# Patient Record
Sex: MALE | Race: WHITE | NOT HISPANIC OR LATINO | Employment: UNEMPLOYED | URBAN - METROPOLITAN AREA
[De-identification: names, ages, dates, MRNs, and addresses within clinical notes are randomized per-mention and may not be internally consistent; named-entity substitution may affect disease eponyms.]

---

## 2023-06-07 ENCOUNTER — HOSPITAL ENCOUNTER (INPATIENT)
Facility: HOSPITAL | Age: 63
LOS: 5 days | Discharge: HOME | DRG: 871 | End: 2023-06-12
Attending: EMERGENCY MEDICINE | Admitting: INTERNAL MEDICINE

## 2023-06-07 DIAGNOSIS — I50.20 HFREF (HEART FAILURE WITH REDUCED EJECTION FRACTION): ICD-10-CM

## 2023-06-07 DIAGNOSIS — J18.9 PNEUMONIA DUE TO INFECTIOUS ORGANISM, UNSPECIFIED LATERALITY, UNSPECIFIED PART OF LUNG: ICD-10-CM

## 2023-06-07 DIAGNOSIS — J18.9 COMMUNITY ACQUIRED PNEUMONIA, UNSPECIFIED LATERALITY: ICD-10-CM

## 2023-06-07 DIAGNOSIS — I95.9 HYPOTENSION, UNSPECIFIED HYPOTENSION TYPE: ICD-10-CM

## 2023-06-07 DIAGNOSIS — R07.9 CHEST PAIN: ICD-10-CM

## 2023-06-07 DIAGNOSIS — J90 PLEURAL EFFUSION, BILATERAL: ICD-10-CM

## 2023-06-07 DIAGNOSIS — A41.9 SEPSIS, DUE TO UNSPECIFIED ORGANISM, UNSPECIFIED WHETHER ACUTE ORGAN DYSFUNCTION PRESENT: ICD-10-CM

## 2023-06-07 DIAGNOSIS — E11.9 TYPE 2 DIABETES MELLITUS WITHOUT COMPLICATION, WITHOUT LONG-TERM CURRENT USE OF INSULIN: ICD-10-CM

## 2023-06-07 DIAGNOSIS — I48.91 ATRIAL FIBRILLATION WITH RAPID VENTRICULAR RESPONSE: ICD-10-CM

## 2023-06-07 DIAGNOSIS — R00.0 TACHYCARDIA: ICD-10-CM

## 2023-06-07 DIAGNOSIS — I48.91 ATRIAL FIBRILLATION WITH RVR: Primary | ICD-10-CM

## 2023-06-07 LAB
ALBUMIN SERPL-MCNC: 4.2 G/DL (ref 3.4–4.8)
ALBUMIN/GLOB SERPL: 1.2 RATIO (ref 1.1–2)
ALP SERPL-CCNC: 80 UNIT/L (ref 40–150)
ALT SERPL-CCNC: 27 UNIT/L (ref 0–55)
APPEARANCE UR: CLEAR
APTT PPP: 33.5 SECONDS
APTT PPP: 33.7 SECONDS
AST SERPL-CCNC: 26 UNIT/L (ref 5–34)
BACTERIA #/AREA URNS AUTO: ABNORMAL /HPF
BASOPHILS # BLD AUTO: 0.07 X10(3)/MCL
BASOPHILS NFR BLD AUTO: 0.6 %
BILIRUB UR QL STRIP.AUTO: NEGATIVE MG/DL
BILIRUBIN DIRECT+TOT PNL SERPL-MCNC: 0.6 MG/DL
BNP BLD-MCNC: 242.9 PG/ML
BUN SERPL-MCNC: 9.6 MG/DL (ref 8.4–25.7)
CALCIUM SERPL-MCNC: 10.1 MG/DL (ref 8.8–10)
CHLORIDE SERPL-SCNC: 100 MMOL/L (ref 98–107)
CO2 SERPL-SCNC: 26 MMOL/L (ref 23–31)
COLOR UR: ABNORMAL
CREAT SERPL-MCNC: 1.08 MG/DL (ref 0.73–1.18)
D DIMER PPP IA.FEU-MCNC: 1.14 UG/ML FEU (ref 0–0.5)
EOSINOPHIL # BLD AUTO: 0.12 X10(3)/MCL (ref 0–0.9)
EOSINOPHIL NFR BLD AUTO: 1 %
ERYTHROCYTE [DISTWIDTH] IN BLOOD BY AUTOMATED COUNT: 12 % (ref 11.5–17)
EST. AVERAGE GLUCOSE BLD GHB EST-MCNC: 251.8 MG/DL
FLUAV AG UPPER RESP QL IA.RAPID: NOT DETECTED
FLUBV AG UPPER RESP QL IA.RAPID: NOT DETECTED
GFR SERPLBLD CREATININE-BSD FMLA CKD-EPI: >60 MLS/MIN/1.73/M2
GLOBULIN SER-MCNC: 3.5 GM/DL (ref 2.4–3.5)
GLUCOSE SERPL-MCNC: 302 MG/DL (ref 82–115)
GLUCOSE UR QL STRIP.AUTO: ABNORMAL MG/DL
HBA1C MFR BLD: 10.4 %
HCT VFR BLD AUTO: 48.4 % (ref 42–52)
HGB BLD-MCNC: 16.1 G/DL (ref 14–18)
HYALINE CASTS #/AREA URNS LPF: ABNORMAL /LPF
IMM GRANULOCYTES # BLD AUTO: 0.05 X10(3)/MCL (ref 0–0.04)
IMM GRANULOCYTES NFR BLD AUTO: 0.4 %
KETONES UR QL STRIP.AUTO: NEGATIVE MG/DL
LACTATE SERPL-SCNC: 2.5 MMOL/L (ref 0.5–2.2)
LACTATE SERPL-SCNC: 2.8 MMOL/L (ref 0.5–2.2)
LACTATE SERPL-SCNC: 4.2 MMOL/L (ref 0.5–2.2)
LEUKOCYTE ESTERASE UR QL STRIP.AUTO: NEGATIVE UNIT/L
LYMPHOCYTES # BLD AUTO: 3.23 X10(3)/MCL (ref 0.6–4.6)
LYMPHOCYTES NFR BLD AUTO: 26.1 %
MAGNESIUM SERPL-MCNC: 1.6 MG/DL (ref 1.6–2.6)
MCH RBC QN AUTO: 31.3 PG (ref 27–31)
MCHC RBC AUTO-ENTMCNC: 33.3 G/DL (ref 33–36)
MCV RBC AUTO: 94.2 FL (ref 80–94)
MONOCYTES # BLD AUTO: 0.73 X10(3)/MCL (ref 0.1–1.3)
MONOCYTES NFR BLD AUTO: 5.9 %
MUCOUS THREADS URNS QL MICRO: ABNORMAL /LPF
NEUTROPHILS # BLD AUTO: 8.16 X10(3)/MCL (ref 2.1–9.2)
NEUTROPHILS NFR BLD AUTO: 66 %
NITRITE UR QL STRIP.AUTO: NEGATIVE
NRBC BLD AUTO-RTO: 0 %
PH UR STRIP.AUTO: 6 [PH]
PLATELET # BLD AUTO: 205 X10(3)/MCL (ref 130–400)
PMV BLD AUTO: 11 FL (ref 7.4–10.4)
POCT GLUCOSE: 312 MG/DL (ref 70–110)
POTASSIUM SERPL-SCNC: 4.1 MMOL/L (ref 3.5–5.1)
PROT SERPL-MCNC: 7.7 GM/DL (ref 5.8–7.6)
PROT UR QL STRIP.AUTO: ABNORMAL MG/DL
RBC # BLD AUTO: 5.14 X10(6)/MCL (ref 4.7–6.1)
RBC #/AREA URNS AUTO: ABNORMAL /HPF
RBC UR QL AUTO: NEGATIVE UNIT/L
SARS-COV-2 RNA RESP QL NAA+PROBE: NOT DETECTED
SODIUM SERPL-SCNC: 136 MMOL/L (ref 136–145)
SP GR UR STRIP.AUTO: 1.03
SQUAMOUS #/AREA URNS LPF: ABNORMAL /HPF
T4 FREE SERPL-MCNC: 1.12 NG/DL (ref 0.7–1.48)
TROPONIN I SERPL-MCNC: 0.04 NG/ML (ref 0–0.04)
TSH SERPL-ACNC: 5.67 UIU/ML (ref 0.35–4.94)
UROBILINOGEN UR STRIP-ACNC: NORMAL MG/DL
WBC # SPEC AUTO: 12.36 X10(3)/MCL (ref 4.5–11.5)
WBC #/AREA URNS AUTO: ABNORMAL /HPF

## 2023-06-07 PROCEDURE — 96368 THER/DIAG CONCURRENT INF: CPT

## 2023-06-07 PROCEDURE — 84484 ASSAY OF TROPONIN QUANT: CPT | Performed by: PHYSICIAN ASSISTANT

## 2023-06-07 PROCEDURE — 87641 MR-STAPH DNA AMP PROBE: CPT | Performed by: STUDENT IN AN ORGANIZED HEALTH CARE EDUCATION/TRAINING PROGRAM

## 2023-06-07 PROCEDURE — 83605 ASSAY OF LACTIC ACID: CPT | Performed by: EMERGENCY MEDICINE

## 2023-06-07 PROCEDURE — 25000003 PHARM REV CODE 250

## 2023-06-07 PROCEDURE — 85730 THROMBOPLASTIN TIME PARTIAL: CPT | Performed by: PHYSICIAN ASSISTANT

## 2023-06-07 PROCEDURE — 96375 TX/PRO/DX INJ NEW DRUG ADDON: CPT

## 2023-06-07 PROCEDURE — 93005 ELECTROCARDIOGRAM TRACING: CPT

## 2023-06-07 PROCEDURE — G0378 HOSPITAL OBSERVATION PER HR: HCPCS

## 2023-06-07 PROCEDURE — 96365 THER/PROPH/DIAG IV INF INIT: CPT

## 2023-06-07 PROCEDURE — 25500020 PHARM REV CODE 255: Performed by: EMERGENCY MEDICINE

## 2023-06-07 PROCEDURE — 84439 ASSAY OF FREE THYROXINE: CPT | Performed by: PHYSICIAN ASSISTANT

## 2023-06-07 PROCEDURE — 83735 ASSAY OF MAGNESIUM: CPT | Performed by: PHYSICIAN ASSISTANT

## 2023-06-07 PROCEDURE — 87205 SMEAR GRAM STAIN: CPT | Performed by: STUDENT IN AN ORGANIZED HEALTH CARE EDUCATION/TRAINING PROGRAM

## 2023-06-07 PROCEDURE — 63600175 PHARM REV CODE 636 W HCPCS: Performed by: EMERGENCY MEDICINE

## 2023-06-07 PROCEDURE — 85730 THROMBOPLASTIN TIME PARTIAL: CPT | Mod: 91 | Performed by: INTERNAL MEDICINE

## 2023-06-07 PROCEDURE — 84443 ASSAY THYROID STIM HORMONE: CPT | Performed by: PHYSICIAN ASSISTANT

## 2023-06-07 PROCEDURE — 80053 COMPREHEN METABOLIC PANEL: CPT | Performed by: PHYSICIAN ASSISTANT

## 2023-06-07 PROCEDURE — 85379 FIBRIN DEGRADATION QUANT: CPT | Performed by: EMERGENCY MEDICINE

## 2023-06-07 PROCEDURE — 83605 ASSAY OF LACTIC ACID: CPT | Mod: 91 | Performed by: STUDENT IN AN ORGANIZED HEALTH CARE EDUCATION/TRAINING PROGRAM

## 2023-06-07 PROCEDURE — 96367 TX/PROPH/DG ADDL SEQ IV INF: CPT

## 2023-06-07 PROCEDURE — 96372 THER/PROPH/DIAG INJ SC/IM: CPT | Performed by: STUDENT IN AN ORGANIZED HEALTH CARE EDUCATION/TRAINING PROGRAM

## 2023-06-07 PROCEDURE — 63600175 PHARM REV CODE 636 W HCPCS: Performed by: STUDENT IN AN ORGANIZED HEALTH CARE EDUCATION/TRAINING PROGRAM

## 2023-06-07 PROCEDURE — 87040 BLOOD CULTURE FOR BACTERIA: CPT | Performed by: EMERGENCY MEDICINE

## 2023-06-07 PROCEDURE — 83036 HEMOGLOBIN GLYCOSYLATED A1C: CPT | Performed by: STUDENT IN AN ORGANIZED HEALTH CARE EDUCATION/TRAINING PROGRAM

## 2023-06-07 PROCEDURE — 25000003 PHARM REV CODE 250: Performed by: EMERGENCY MEDICINE

## 2023-06-07 PROCEDURE — 0240U COVID/FLU A&B PCR: CPT | Performed by: PHYSICIAN ASSISTANT

## 2023-06-07 PROCEDURE — 81001 URINALYSIS AUTO W/SCOPE: CPT | Performed by: EMERGENCY MEDICINE

## 2023-06-07 PROCEDURE — 80061 LIPID PANEL: CPT | Performed by: STUDENT IN AN ORGANIZED HEALTH CARE EDUCATION/TRAINING PROGRAM

## 2023-06-07 PROCEDURE — 85610 PROTHROMBIN TIME: CPT | Performed by: PHYSICIAN ASSISTANT

## 2023-06-07 PROCEDURE — 83880 ASSAY OF NATRIURETIC PEPTIDE: CPT | Performed by: PHYSICIAN ASSISTANT

## 2023-06-07 PROCEDURE — 25000003 PHARM REV CODE 250: Performed by: STUDENT IN AN ORGANIZED HEALTH CARE EDUCATION/TRAINING PROGRAM

## 2023-06-07 PROCEDURE — 99285 EMERGENCY DEPT VISIT HI MDM: CPT | Mod: 25

## 2023-06-07 PROCEDURE — 84100 ASSAY OF PHOSPHORUS: CPT | Performed by: STUDENT IN AN ORGANIZED HEALTH CARE EDUCATION/TRAINING PROGRAM

## 2023-06-07 PROCEDURE — 87070 CULTURE OTHR SPECIMN AEROBIC: CPT | Performed by: STUDENT IN AN ORGANIZED HEALTH CARE EDUCATION/TRAINING PROGRAM

## 2023-06-07 PROCEDURE — 85025 COMPLETE CBC W/AUTO DIFF WBC: CPT | Performed by: PHYSICIAN ASSISTANT

## 2023-06-07 PROCEDURE — 11000001 HC ACUTE MED/SURG PRIVATE ROOM

## 2023-06-07 RX ORDER — ASPIRIN 81 MG/1
81 TABLET ORAL DAILY
Status: DISCONTINUED | OUTPATIENT
Start: 2023-06-08 | End: 2023-06-12 | Stop reason: HOSPADM

## 2023-06-07 RX ORDER — LAMOTRIGINE 200 MG/1
200 TABLET ORAL DAILY
COMMUNITY
Start: 2023-05-15

## 2023-06-07 RX ORDER — LAMOTRIGINE 100 MG/1
200 TABLET ORAL DAILY
Status: DISCONTINUED | OUTPATIENT
Start: 2023-06-08 | End: 2023-06-12 | Stop reason: HOSPADM

## 2023-06-07 RX ORDER — MIRTAZAPINE 30 MG/1
30 TABLET, FILM COATED ORAL NIGHTLY
Status: DISCONTINUED | OUTPATIENT
Start: 2023-06-07 | End: 2023-06-12 | Stop reason: HOSPADM

## 2023-06-07 RX ORDER — DULOXETIN HYDROCHLORIDE 60 MG/1
120 CAPSULE, DELAYED RELEASE ORAL DAILY
COMMUNITY
Start: 2023-05-15

## 2023-06-07 RX ORDER — SODIUM CHLORIDE, SODIUM LACTATE, POTASSIUM CHLORIDE, CALCIUM CHLORIDE 600; 310; 30; 20 MG/100ML; MG/100ML; MG/100ML; MG/100ML
INJECTION, SOLUTION INTRAVENOUS CONTINUOUS
Status: DISCONTINUED | OUTPATIENT
Start: 2023-06-08 | End: 2023-06-08

## 2023-06-07 RX ORDER — METFORMIN HYDROCHLORIDE 1000 MG/1
1000 TABLET ORAL 2 TIMES DAILY
COMMUNITY
Start: 2023-06-02 | End: 2024-03-26 | Stop reason: SDUPTHER

## 2023-06-07 RX ORDER — AMOXICILLIN 500 MG
1 CAPSULE ORAL 2 TIMES DAILY
COMMUNITY

## 2023-06-07 RX ORDER — ATORVASTATIN CALCIUM 20 MG/1
20 TABLET, FILM COATED ORAL NIGHTLY
Status: DISCONTINUED | OUTPATIENT
Start: 2023-06-07 | End: 2023-06-12 | Stop reason: HOSPADM

## 2023-06-07 RX ORDER — METOPROLOL TARTRATE 1 MG/ML
5 INJECTION, SOLUTION INTRAVENOUS
Status: COMPLETED | OUTPATIENT
Start: 2023-06-07 | End: 2023-06-07

## 2023-06-07 RX ORDER — ATORVASTATIN CALCIUM 20 MG/1
20 TABLET, FILM COATED ORAL NIGHTLY
COMMUNITY
Start: 2023-06-06 | End: 2024-03-26 | Stop reason: SDUPTHER

## 2023-06-07 RX ORDER — INSULIN ASPART 100 [IU]/ML
0-5 INJECTION, SOLUTION INTRAVENOUS; SUBCUTANEOUS
Status: DISCONTINUED | OUTPATIENT
Start: 2023-06-07 | End: 2023-06-09

## 2023-06-07 RX ORDER — TRAZODONE HYDROCHLORIDE 150 MG/1
450 TABLET ORAL NIGHTLY
COMMUNITY
Start: 2023-05-15

## 2023-06-07 RX ORDER — ASCORBIC ACID 500 MG
500 TABLET ORAL 2 TIMES DAILY
COMMUNITY

## 2023-06-07 RX ORDER — SODIUM CHLORIDE, SODIUM LACTATE, POTASSIUM CHLORIDE, CALCIUM CHLORIDE 600; 310; 30; 20 MG/100ML; MG/100ML; MG/100ML; MG/100ML
INJECTION, SOLUTION INTRAVENOUS CONTINUOUS
Status: DISCONTINUED | OUTPATIENT
Start: 2023-06-07 | End: 2023-06-07

## 2023-06-07 RX ORDER — SODIUM CHLORIDE 0.9 % (FLUSH) 0.9 %
10 SYRINGE (ML) INJECTION EVERY 12 HOURS PRN
Status: DISCONTINUED | OUTPATIENT
Start: 2023-06-07 | End: 2023-06-07

## 2023-06-07 RX ORDER — DULOXETIN HYDROCHLORIDE 30 MG/1
120 CAPSULE, DELAYED RELEASE ORAL DAILY
Status: DISCONTINUED | OUTPATIENT
Start: 2023-06-08 | End: 2023-06-12 | Stop reason: HOSPADM

## 2023-06-07 RX ORDER — ASPIRIN 81 MG/1
81 TABLET ORAL DAILY
COMMUNITY

## 2023-06-07 RX ORDER — RAMIPRIL 10 MG/1
10 CAPSULE ORAL DAILY
Status: ON HOLD | COMMUNITY
Start: 2023-06-06 | End: 2023-06-12 | Stop reason: HOSPADM

## 2023-06-07 RX ORDER — DEXTROSE 40 %
30 GEL (GRAM) ORAL
Status: DISCONTINUED | OUTPATIENT
Start: 2023-06-07 | End: 2023-06-12 | Stop reason: HOSPADM

## 2023-06-07 RX ORDER — NALOXONE HCL 0.4 MG/ML
0.02 VIAL (ML) INJECTION
Status: DISCONTINUED | OUTPATIENT
Start: 2023-06-07 | End: 2023-06-12 | Stop reason: HOSPADM

## 2023-06-07 RX ORDER — MONTELUKAST SODIUM 10 MG/1
10 TABLET ORAL NIGHTLY
COMMUNITY
End: 2023-07-19

## 2023-06-07 RX ORDER — DEXTROSE 40 %
15 GEL (GRAM) ORAL
Status: DISCONTINUED | OUTPATIENT
Start: 2023-06-07 | End: 2023-06-12 | Stop reason: HOSPADM

## 2023-06-07 RX ORDER — HEPARIN SODIUM,PORCINE/D5W 25000/250
0-40 INTRAVENOUS SOLUTION INTRAVENOUS CONTINUOUS
Status: DISCONTINUED | OUTPATIENT
Start: 2023-06-07 | End: 2023-06-09

## 2023-06-07 RX ORDER — MIRTAZAPINE 30 MG/1
30 TABLET, FILM COATED ORAL NIGHTLY
COMMUNITY
Start: 2023-05-15

## 2023-06-07 RX ORDER — PANTOPRAZOLE SODIUM 40 MG/1
40 TABLET, DELAYED RELEASE ORAL DAILY
Status: DISCONTINUED | OUTPATIENT
Start: 2023-06-07 | End: 2023-06-12 | Stop reason: HOSPADM

## 2023-06-07 RX ORDER — GLUCAGON 1 MG
1 KIT INJECTION
Status: DISCONTINUED | OUTPATIENT
Start: 2023-06-07 | End: 2023-06-12 | Stop reason: HOSPADM

## 2023-06-07 RX ORDER — LORATADINE 10 MG/1
10 TABLET ORAL DAILY
COMMUNITY

## 2023-06-07 RX ORDER — MAGNESIUM SULFATE HEPTAHYDRATE 40 MG/ML
4 INJECTION, SOLUTION INTRAVENOUS
Status: COMPLETED | OUTPATIENT
Start: 2023-06-07 | End: 2023-06-07

## 2023-06-07 RX ADMIN — ATORVASTATIN CALCIUM 20 MG: 20 TABLET, FILM COATED ORAL at 08:06

## 2023-06-07 RX ADMIN — HUMAN INSULIN 10 UNITS: 100 INJECTION, SOLUTION SUBCUTANEOUS at 08:06

## 2023-06-07 RX ADMIN — MAGNESIUM SULFATE HEPTAHYDRATE 4 G: 40 INJECTION, SOLUTION INTRAVENOUS at 11:06

## 2023-06-07 RX ADMIN — METOPROLOL TARTRATE 5 MG: 1 INJECTION, SOLUTION INTRAVENOUS at 11:06

## 2023-06-07 RX ADMIN — DEXTROSE MONOHYDRATE 100 MG: 50 INJECTION, SOLUTION INTRAVENOUS at 12:06

## 2023-06-07 RX ADMIN — CEFTRIAXONE SODIUM 2 G: 2 INJECTION, POWDER, FOR SOLUTION INTRAMUSCULAR; INTRAVENOUS at 12:06

## 2023-06-07 RX ADMIN — PANTOPRAZOLE SODIUM 40 MG: 40 TABLET, DELAYED RELEASE ORAL at 10:06

## 2023-06-07 RX ADMIN — SODIUM CHLORIDE, POTASSIUM CHLORIDE, SODIUM LACTATE AND CALCIUM CHLORIDE 1000 ML: 600; 310; 30; 20 INJECTION, SOLUTION INTRAVENOUS at 11:06

## 2023-06-07 RX ADMIN — AMIODARONE HYDROCHLORIDE 1 MG/MIN: 1.8 INJECTION, SOLUTION INTRAVENOUS at 12:06

## 2023-06-07 RX ADMIN — AMIODARONE HYDROCHLORIDE 150 MG: 1.5 INJECTION, SOLUTION INTRAVENOUS at 12:06

## 2023-06-07 RX ADMIN — HEPARIN SODIUM 10 UNITS/KG/HR: 10000 INJECTION, SOLUTION INTRAVENOUS at 08:06

## 2023-06-07 RX ADMIN — IOHEXOL 100 ML: 350 INJECTION, SOLUTION INTRAVENOUS at 01:06

## 2023-06-07 RX ADMIN — SODIUM CHLORIDE, POTASSIUM CHLORIDE, SODIUM LACTATE AND CALCIUM CHLORIDE 1000 ML: 600; 310; 30; 20 INJECTION, SOLUTION INTRAVENOUS at 01:06

## 2023-06-07 RX ADMIN — AMIODARONE HYDROCHLORIDE 0.5 MG/MIN: 1.8 INJECTION, SOLUTION INTRAVENOUS at 06:06

## 2023-06-07 RX ADMIN — INSULIN ASPART 2 UNITS: 100 INJECTION, SOLUTION INTRAVENOUS; SUBCUTANEOUS at 08:06

## 2023-06-07 RX ADMIN — MIRTAZAPINE 30 MG: 30 TABLET, FILM COATED ORAL at 08:06

## 2023-06-07 NOTE — H&P
[facility] Internal Medicine History and Physical     Date of Admit: 6/7/2023  Current Hospital Day: 1     Chief Complaint:      Shortness of Breath (PT W CO SOB/COUGH AND INTERMITTENT CP X 3 DAYS.  RECENT EXPOSURE TO COVID AT WORK.  HR > 130 EKG OBTAINED. EKG A FIB W RVR . NO HX REPORTED. ) and Tachycardia      Subjective:     History of Present Illness:  Alphonso Wyatt is a 62 y.o. male with PMHx of HTN and DM who presented to [facility] ED with primary complaint of worsening shortness of breath, cough, and intermittent CP since Friday (6/8/23). Pt states symptoms began approximately x3 weeks ago with a sinus infection that migrated to his chest then resolved without any difficulties. 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 Friday, he began experiencing shortness of breath and non-productive cough without hemoptysis but with occasional pleuritic chest pain. Prior to event he denies experiencing any HAYNES. Currently reports shortness of breath at rest and with only taking a few steps.  Denies any lower extremity edema.  Reports sleeping in upright recliner for some time even though was able to lie flat without respiratory difficulties, though now is having orthopnea like symptoms.  Patient denies any previous history of DVT/PE.  Denies any recent travel or extended sedentary periods.     ED Course: Upon ED presentation HR significant for tachycardia (160), tachypnea (23), afebrile, and normotensive.  EKG revealed Afib w/ RVR in which Lopressor 5 mg was administered. Heart rate mildly (130 beats per minute) though patient became hypotensive (89/69) thus was initiated on amiodarone gtt.  He was also administered x2 L of LR.  D-dimer (1.14).  CT angio thorax performed, no pulmonary embolism identified, showed moderate B/L pleural effusions and ground-glass opacities (infectious versus inflammatory).  Additional lab significant for  leukocytosis (12.3), hyperglycemia (302), BNP (242.9), TSH (5.6, with normal T4), and lactate (2.5). Internal Medicine consulted     Review of Systems:  Review of Systems   Constitutional:  Negative for chills, diaphoresis, fever and weight loss.   Respiratory:  Positive for cough (non productive) and shortness of breath. Negative for hemoptysis, sputum production and wheezing.    Cardiovascular:  Positive for chest pain (pleuritic) and orthopnea. Negative for palpitations, claudication, leg swelling and PND.   Neurological:  Positive for dizziness and weakness. Negative for focal weakness, seizures and loss of consciousness.     Past Medical History:  Hypertension, diabetes    Past Surgical History:  History reviewed. No pertinent surgical history.    Social History:  Etoh: Prior use, currently occasionally  Tobacco: Current 1ppd (approximate 100 pack year smoker)  Illicit Substances: Denies    Family Hx:  Mother: DM, HTN, CHF, MI  Maternal Grandfather: MI  Multiple Maternal Aunts/Uncles/Cousins: MI  Father: N/A    Allergies:  Review of patient's allergies indicates:  No Known Allergies    Home Medications:  Prior to Admission medications    Medication Sig Start Date End Date Taking? Authorizing Provider   ascorbic acid, vitamin C, (VITAMIN C) 500 MG tablet Take 500 mg by mouth 2 (two) times daily.    Historical Provider   aspirin (ECOTRIN) 81 MG EC tablet Take 81 mg by mouth once daily.    Historical Provider   atorvastatin (LIPITOR) 20 MG tablet Take 20 mg by mouth nightly. 6/6/23   Historical Provider   DULoxetine (CYMBALTA) 60 MG capsule Take 120 mg by mouth once daily. 5/15/23   Historical Provider   lamoTRIgine (LAMICTAL) 200 MG tablet Take 200 mg by mouth once daily. 5/15/23   Historical Provider   loratadine (CLARITIN) 10 mg tablet Take 10 mg by mouth once daily.    Historical Provider   metFORMIN (GLUCOPHAGE) 1000 MG tablet Take 1,000 mg by mouth 2 (two) times daily. 6/2/23   Historical Provider  "  mirtazapine (REMERON) 30 MG tablet Take 30 mg by mouth every evening. 5/15/23   Historical Provider   montelukast (SINGULAIR) 10 mg tablet Take 10 mg by mouth every evening.    Historical Provider   omega-3 fatty acids/fish oil (FISH OIL-OMEGA-3 FATTY ACIDS) 300-1,000 mg capsule Take 1 capsule by mouth 2 (two) times a day.    Historical Provider   ramipriL (ALTACE) 10 MG capsule Take 10 mg by mouth once daily. 6/6/23   Historical Provider   traZODone (DESYREL) 150 MG tablet Take 450 mg by mouth every evening. 5/15/23   Historical Provider        Objective:   Vitals  Vitals  BP: 94/77  Temp: 97.9 °F (36.6 °C)  Temp Source: Oral  Pulse: (!) 122  Resp: 18  SpO2: 95 %  Height: 5' 9" (175.3 cm)  Weight: 95 kg (209 lb 7 oz)    Physical Examination:  Physical Exam  Constitutional:       General: He is not in acute distress.     Appearance: Normal appearance. He is ill-appearing. He is not diaphoretic.   HENT:      Head: Normocephalic and atraumatic.      Mouth/Throat:      Mouth: Mucous membranes are moist.      Pharynx: Oropharynx is clear. No oropharyngeal exudate or posterior oropharyngeal erythema.   Eyes:      General: No scleral icterus.     Extraocular Movements: Extraocular movements intact.      Pupils: Pupils are equal, round, and reactive to light.   Cardiovascular:      Rate and Rhythm: Tachycardia present. Rhythm irregular.      Pulses: Normal pulses.      Heart sounds: Normal heart sounds. No murmur heard.    No gallop.   Pulmonary:      Effort: No respiratory distress.      Breath sounds: No stridor. Rales (R>L) present. No wheezing or rhonchi.   Abdominal:      General: There is distension.      Tenderness: There is no abdominal tenderness. There is no guarding or rebound.   Musculoskeletal:         General: No swelling or tenderness.      Right lower leg: No edema.      Left lower leg: No edema.   Skin:     General: Skin is warm and dry.      Capillary Refill: Capillary refill takes less than 2 seconds. "      Coloration: Skin is not jaundiced or pale.      Findings: No bruising, erythema or lesion.   Neurological:      General: No focal deficit present.      Mental Status: He is alert and oriented to person, place, and time.   Psychiatric:         Mood and Affect: Mood normal.         Behavior: Behavior normal.         Thought Content: Thought content normal.         Judgment: Judgment normal.         Laboratory:  CMP:  Recent Labs   Lab 06/07/23  1048      K 4.1   CHLORIDE 100   CO2 26   BUN 9.6   CREATININE 1.08   GLUCOSE 302*   CALCIUM 10.1*   ALBUMIN 4.2   BILITOT 0.6   AST 26   ALT 27   ALKPHOS 80       CBC:  Recent Labs   Lab 06/07/23  1048   WBC 12.36*   ABSNEUTRO 8.16   RBC 5.14   HGB 16.1   HCT 48.4      MCV 94.2*   RDW 12.0       Trended Cardiac Data:   Recent Labs   Lab 06/07/23  1048   TROPONINI 0.040       Thyroid:   Recent Labs   Lab 06/07/23  1048   TSH 5.666*   [id number] 1.12       Urinalysis:   Recent Labs   Lab 06/07/23  1438   COLORUA Light-Yellow   APPEARANCEUA Clear   SGUA 1.035   PHUA 6.0   PROTEINUA 2+*   GLUCOSEUA Trace*   KETONESUA Negative   BLOODUA Negative   BILIRUBINUA Negative   UROBILINOGEN Normal   NITRITESUA Negative   LEUKOCYTESUR Negative   WBCUA 0-5   BACTERIA None Seen   SQEPUA Trace*   MUCOUSUA Trace*   HYALINECASTS 6-10*   RBCUA 0-5       REVIEWED      Radiology:  X-Ray Chest 1 View    Result Date: 6/7/2023  As above.  Recommend continued follow-up.  If further evaluation is obtained recommend two views the chest. Electronically signed by: Stuart Cornelius Date:    06/07/2023 Time:    11:23    CTA Chest Non-Coronary (PE Studies)    Result Date: 6/7/2023  1. Moderate bilateral pleural effusions with compressive atelectasis. 2. Few foci of ground-glass likely infectious or inflammatory. 3. No convincing pulmonary embolus. 4. Small volume ascites. Electronically signed by: Freddy Sinclair Date:    06/07/2023 Time:    13:52       Assessment & Plan:   1.) New Onset Atrial  Fibrillation with RVR  - Suspicion for etiology 2/2 infection (vs) PE (vs) hypovolemia (vs) left atrial enlargement w/ suspected new onset CHF  - EKG showing afib: rate: 151  QTc: 491  No ST or T wave changes. Rightward Axis  - CXR as above  - CTA Thorax w/out PE evidence  - Echocardiogram ordered, pending results  - TSH elevated (5.6), T4 wnl  - Repleting Magnesium (1.60)  - [id number] score: 2  - Anticoagulation: Heparin gtt  - C/w Amiodarone gtt at this time  - Pt may benefit from addition of Digoxin IV if rate continues to remain elevated despite Amio gtt  - Pending cardiology consultation for assistance with rate control give above amiodarone gtt w/ current soft Bps & difficulty administering IV lopressor/dilt pushes at this time    2.) Sepsis; SIRS (3/4, tachycardia, tachypnea, leukocytosis)  3.) Lactic Acidosis  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 Plan to de-escalate per cultures/sensitivities  - Covid/Flu negative  - Pending: Blood cx x2, Resp Gram Stain/Culture, MRSA PCR, UA  - Incentive Spirometry    5.) Suspected New-Onset Congestive Heart Failure     6.) B/L Pleural Effusions (R>L)  - New-onset Orthopnea & HAYNES in addition to B/L Pleural Effusions on CT Thorax  - No previous TTE on file  - Bedside TTE w/ IVC 1.8 cm & non-collapsible on inspiratory effort. Also possible global hypokinesia; pending official TTE completion/read  - BNP on presentation (242.9)  - Troponin: WNL (0.040)  - Pending TTE completion; pt may benefit from further cardiac ischemic w/u if mod-severely reduced  - Holding Lasix  at this time as BP soft w/ current A-fib & soft Bps  - Plan to administer Lasix as hemodynamic stability improves is appropriate  - ASA, Statin    7.) Diabetes mellitus    - Holding home metformin 1000mg BID  - Reports prior lantus 45u q.Nightly though has not used insulin in many years  - LDSSI started; plan to titrate as required and likely add basal insulin with hx of requiring & presentation CBG (300s)   -- Given x10u regular insulin  - Hb A1c: Pending    8.) Hx of HTN  9.) Hx of HLD  - Holding home Ramipril 10mg q.D. at this time; plan to restart as BP permits  - C/w home Lipitor    10.) Hx of Bipolar Disorder/MDD  - C/w home Duloxetine, Mirtazapine, & Lamotrigine    11.) Suspicion for hepatomegaly/liver pathology  - No significant etoh hx  - Enlarged on bedside U/S & ascites appreciated on CT Thorax  - Pending Hepatitis Panel; pt reports multiple nail punctures/cuts from patients at work without recent labs completed  - Pending RUQ U/S    CODE STATUS: Full Code  Access: PIV  Antimicrobials: Rocephin/Azithromycin (D1)  Diet: Diet heart healthy Diabetic   DVT Prophylaxis: Heparin gtt  GI Prophylaxis: Protonix 40  Fluids:  None (discontinued w/ current IVC s/p 30 cc/kg admin & unknown LVEF)    Disposition: Patient admitted for telemetry in setting of new onset a-fib w/ RVR requiring Amiodarone gtt, suspected new onset CHF, Pneumonia, & Sepsis for additional monitoring & treatment. Day 1 abx as above. Pending cultures. Holding additional IV crystalloids while pending lactate w/ unknown LVEF & dilated IVC. Plan to initiate diuretics pending TTE completion if appropriate & hemodynamic stability.      Jose Palacios MD  Internal Medicine PGY-2

## 2023-06-07 NOTE — ED NOTES
Notified dr doan pt heartrate is 120-130s he is still on the amiodorone gtt. Pt blood pressure is 94/77 and his O2 sat is 94%. Pt states he is SOB and dr doan was made aware. Md stated to give 250ml normal saline bolus because he may be intravascularly depleted.

## 2023-06-07 NOTE — ED NOTES
Notified dr doan pt states he feels hot inside he does not feel good and he is SOB. Md stated he will come to see patient   [facility]...

## 2023-06-07 NOTE — ED PROVIDER NOTES
ED PROVIDER NOTE  6/7/2023    CHIEF COMPLAINT:   Chief Complaint   Patient presents with    Shortness of Breath     PT W CO SOB/COUGH AND INTERMITTENT CP X 3 DAYS.  RECENT EXPOSURE TO COVID AT WORK.  HR > 130 EKG OBTAINED. EKG A FIB W RVR . NO HX REPORTED.     Tachycardia       HISTORY OF PRESENT ILLNESS:   Alphonso Wyatt is a 62 y.o. male who presents with chief complaint Shortness of breath.  Onset was about 5 days ago whenever he began having shortness of breath described as dyspnea on exertion that improves with rest.  Associated symptoms of cough, sore throat, and fatigue with intermittent chest pains only after coughing fits.  Reports taking COVID test at home that was positive.  Denies history of atrial fibrillation.    The history is provided by the patient.       REVIEW OF SYSTEMS: as noted in the HPI.  NURSING NOTES REVIEWED      PAST MEDICAL/SURGICAL HISTORY:   Past Medical History:   Diagnosis Date    Diabetes mellitus     Hypertension     History reviewed. No pertinent surgical history.    FAMILY HISTORY: History reviewed. No pertinent family history.    SOCIAL HISTORY:   Social History     Tobacco Use    Smoking status: Every Day     Types: Cigarettes    Smokeless tobacco: Never       ALLERGIES: Review of patient's allergies indicates:  No Known Allergies    PHYSICAL EXAM:  Initial Vitals [06/07/23 1032]   BP Pulse Resp Temp SpO2   (!) 125/92 (!) 132 18 97.5 °F (36.4 °C) 100 %      MAP       --         Physical Exam    Nursing note and vitals reviewed.  Constitutional: He appears well-developed and well-nourished. No distress.   HENT:   Head: Normocephalic and atraumatic.   Nose: Nose normal.   Mouth/Throat: Mucous membranes are dry.   Eyes: Conjunctivae and EOM are normal. Pupils are equal, round, and reactive to light.   Neck: Neck supple. No tracheal deviation present.   Cardiovascular:  Normal heart sounds, intact distal pulses and normal pulses. An irregularly irregular rhythm present.    Tachycardia present.         Pulmonary/Chest: Effort normal. No respiratory distress. He has rhonchi in the right middle field and the right lower field. He has rales in the right lower field and the left lower field.   Abdominal: Abdomen is soft. There is no abdominal tenderness. There is no rebound and no guarding.   Musculoskeletal:         General: Normal range of motion.      Cervical back: Neck supple.     Neurological: He is alert and oriented to person, place, and time. GCS eye subscore is 4. GCS verbal subscore is 5. GCS motor subscore is 6.   CN II-XII intact. Moves all extremities. No gross sensory or motor deficits.   Skin: Skin is warm, dry and intact.   Psychiatric: He has a normal mood and affect. His speech is normal and behavior is normal. Judgment and thought content normal. Cognition and memory are normal.       RESULTS:  Labs Reviewed   COMPREHENSIVE METABOLIC PANEL - Abnormal; Notable for the following components:       Result Value    Glucose Level 302 (*)     Calcium Level Total 10.1 (*)     Protein Total 7.7 (*)     All other components within normal limits   B-TYPE NATRIURETIC PEPTIDE - Abnormal; Notable for the following components:    Natriuretic Peptide 242.9 (*)     All other components within normal limits   TSH - Abnormal; Notable for the following components:    Thyroid Stimulating Hormone 5.666 (*)     All other components within normal limits   CBC WITH DIFFERENTIAL - Abnormal; Notable for the following components:    WBC 12.36 (*)     MCV 94.2 (*)     MCH 31.3 (*)     MPV 11.0 (*)     IG# 0.05 (*)     All other components within normal limits   LACTIC ACID, PLASMA - Abnormal; Notable for the following components:    Lactic Acid Level 2.5 (*)     All other components within normal limits   URINALYSIS, REFLEX TO URINE CULTURE - Abnormal; Notable for the following components:    Protein, UA 2+ (*)     Glucose, UA Trace (*)     Squamous Epithelial Cells, UA Trace (*)     Mucous, UA  Trace (*)     Hyaline Casts, UA 6-10 (*)     All other components within normal limits   D DIMER, QUANTITATIVE - Abnormal; Notable for the following components:    D-Dimer 1.14 (*)     All other components within normal limits   LACTIC ACID, PLASMA - Abnormal; Notable for the following components:    Lactic Acid Level 2.8 (*)     All other components within normal limits   TROPONIN I - Normal   MAGNESIUM - Normal   COVID/FLU A&B PCR - Normal    Narrative:     The Xpert Xpress SARS-CoV-2/FLU/RSV plus is a rapid, multiplexed real-time PCR test intended for the simultaneous qualitative detection and differentiation of SARS-CoV-2, Influenza A, Influenza B, and respiratory syncytial virus (RSV) viral RNA in either nasopharyngeal swab or nasal swab specimens.         APTT - Normal   T4, FREE - Normal   BLOOD CULTURE [facility]   BLOOD CULTURE [facility]   RESPIRATORY CULTURE ([facility])   GRAM STAIN [facility]   CBC W/ AUTO DIFFERENTIAL    Narrative:     The following orders were created for panel order CBC Auto Differential.  Procedure                               Abnormality         Status                     ---------                               -----------         ------                     CBC with Differential[[id number]]        Abnormal            Final result                 Please view results for these tests on the individual orders.   PROTIME-INR   EXTRA TUBES    Narrative:     The following orders were created for panel order EXTRA TUBES.  Procedure                               Abnormality         Status                     ---------                               -----------         ------                     Pink Top Hold[[id number]]                                    In process                   Please view results for these tests on the individual orders.   PINK TOP HOLD   MRSA PCR   POCT GLUCOSE MONITORING CONTINUOUS     Imaging Results              CTA Chest Non-Coronary (PE Studies) (Final result)  Result time 06/07/23 13:52:55       Final result by Freddy Sinclari MD (06/07/23 13:52:55)                   Impression:      1. Moderate bilateral pleural effusions with compressive atelectasis.  2. Few foci of ground-glass likely infectious or inflammatory.  3. No convincing pulmonary embolus.  4. Small volume ascites.      Electronically signed by: Freddy Sinclair  Date:    06/07/2023  Time:    13:52               Narrative:    EXAMINATION:  CTA CHEST NON CORONARY (PE STUDIES)    CLINICAL HISTORY:  Pulmonary embolism (PE) suspected, positive D-dimer;    TECHNIQUE:  Helical acquisition through the chest with IV contrast targeting the pulmonary arteries. Multiplanar and 3D MIP reconstructed images were provided for review. DLP 1131 mGycm. Automatic exposure control, adjustment of mA/kV or iterative reconstruction technique was used to reduce radiation.    COMPARISON:  None available.    FINDINGS:  There is good opacification of the pulmonary arterial tree. Some right lower extremity arteries become poorly opacified distally but this does not have the appearance of an embolus.  Well opacified pulmonary arteries elsewhere with no evidence of pulmonary thromboembolic disease.    Prominent mediastinal and hilar lymph nodes are nonspecific and may be reactive.  No axillary adenopathy.    Heart size upper limit normal.  There are coronary artery calcifications.  No pericardial effusion.    There are moderate bilateral pleural effusions, right larger than left.  There is adjacent compressive atelectasis.  Mild paraseptal predominant emphysema.  Few areas of ground-glass likely infectious or inflammatory.  For example left upper lobe image 37 series 3.  Mild bronchial wall thickening.    There is small volume ascites.  Mild degenerative change of the spine.                                       X-Ray Chest 1 View (Final result)  Result time 06/07/23 11:23:44   Procedure changed from X-Ray Chest PA And Lateral     Final result by Stuart Cornelius MD  (06/07/23 11:23:44)                   Impression:      As above.  Recommend continued follow-up.  If further evaluation is obtained recommend two views the chest.      Electronically signed by: Stuart Cornelius  Date:    06/07/2023  Time:    11:23               Narrative:    EXAMINATION:  XR CHEST 1 VIEW    CLINICAL HISTORY:  SOB, tachycardia;    TECHNIQUE:  Single view of the chest    COMPARISON:  No prior imaging available for comparison.    FINDINGS:  Prominent interstitial markings throughout.  Cardiomegaly is noted with small right effusion.  No definite focal opacification.  No acute osseous abnormality.                                      PROCEDURES:  Procedures    ECG:  EKG Readings: (Independently Interpreted)   Initial Reading: No STEMI. Rhythm: Atrial Fibrillation. Heart Rate: 151. Axis: Right Axis Deviation.     ED COURSE AND MEDICAL DECISION MAKING:  Medications   amiodarone 360 mg/200 mL (1.8 mg/mL) infusion (1 mg/min Intravenous New Bag 6/7/23 1244)   amiodarone 360 mg/200 mL (1.8 mg/mL) infusion (0.5 mg/min Intravenous New Bag 6/7/23 1813)   iohexoL (OMNIPAQUE 350) 350 mg iodine/mL injection (has no administration in time range)   naloxone 0.4 mg/mL injection 0.02 mg (has no administration in time range)   glucagon (human recombinant) injection 1 mg (has no administration in time range)   dextrose 10% bolus 125 mL 125 mL (has no administration in time range)   dextrose 10% bolus 250 mL 250 mL (has no administration in time range)   dextrose 40 % gel 15,000 mg (has no administration in time range)   dextrose 40 % gel 30,000 mg (has no administration in time range)   insulin aspart U-100 injection 0-5 Units (has no administration in time range)   cefTRIAXone (ROCEPHIN) 1 g in dextrose 5 % in water (D5W) 5 % 100 mL IVPB (MB+) (has no administration in time range)   aspirin EC tablet 81 mg (has no administration in time range)   atorvastatin tablet 20 mg (has no administration in time range)   DULoxetine  DR capsule 120 mg (has no administration in time range)   lamoTRIgine tablet 200 mg (has no administration in time range)   mirtazapine tablet 30 mg (has no administration in time range)   azithromycin (ZITHROMAX) 500 mg in sodium chloride 0.9% 250 mL IVPB (has no administration in time range)   sodium chloride 0.9% bolus 250 mL 250 mL (has no administration in time range)   heparin 25,000 units in dextrose 5% (100 units/ml) IV bolus from bag INITIAL BOLUS (has no administration in time range)   heparin 25,000 units in dextrose 5% (100 units/ml) IV bolus from bag - ADDITIONAL PRN BOLUS - 60 units/kg (has no administration in time range)   heparin 25,000 units in dextrose 5% (100 units/ml) IV bolus from bag - ADDITIONAL PRN BOLUS - 30 units/kg (has no administration in time range)   heparin 25,000 units in dextrose 5% 250 mL (100 units/mL) infusion LOW INTENSITY nomogram - LAF (has no administration in time range)   metoprolol injection 5 mg (5 mg Intravenous Given 6/7/23 1105)   magnesium sulfate 2g in water 50mL IVPB (premix) (0 g Intravenous Stopped 6/7/23 1205)   cefTRIAXone (ROCEPHIN) 2 g in dextrose 5 % in water (D5W) 5 % 100 mL IVPB (MB+) (0 g Intravenous Stopped 6/7/23 1244)   lactated ringers bolus 1,000 mL (0 mLs Intravenous Stopped 6/7/23 1241)   doxycycline (VIBRAMYCIN) 100 mg in dextrose 5 % (D5W) 100 mL IVPB (0 mg Intravenous Stopped 6/7/23 1328)   amiodarone in dextrose 150 mg/100 mL (1.5 mg/mL) loading dose 150 mg (0 mg Intravenous Stopped 6/7/23 1235)   lactated ringers bolus 1,000 mL (0 mLs Intravenous Stopped 6/7/23 1415)   iohexoL (OMNIPAQUE 350) injection 100 mL (100 mLs Intravenous Given 6/7/23 1332)     ED Course as of 06/07/23 2017 Wed Jun 07, 2023   1142 BP dropped to 88/74 after metoprolol, HR did improve into the 120s. BP starting to come back up currently 96/74. Will start IV fluids and when BP improves further then plan to start amiodarone for his atrial fibrillation. [IB]      ED  Course User Index  [IB] Leonardo Montanez DO        Medical Decision Making  62-year-old male who presents with progressively worsening shortness of breath associated with cough and fatigue over the past 5 days.  He was tachycardic upon arrival with ECG showing atrial fibrillation with a rapid ventricular response, this is a new diagnosis for him.  He was given dose of IV metoprolol with some improvement in his heart rate into the 120s however his blood pressure which was 125/92 drop to 88/74.  It did seem to respond to IV fluids and he was subsequently started on amiodarone. Chest x-ray showed cardiomegaly with increased interstitial markings and small right effusion. CBC shows a leukocytosis of 12.3, and given concern for pneumonia he was given Rocephin and doxycycline IV.  Lactic acid 2.5. Did not want to be aggressive with IV fluids due to concern for underlying CHF, so he was not given 30 milliliter/kg fluid bolus. CMP shows glucose of 302 otherwise grossly unremarkable.  .  TSH 5.6 with normal free T4.  COVID and flu swab negative.  D-dimer 1.14 so CTA chest was obtained to evaluate for PE.  CTA shows moderate bilateral pleural effusions with compressive atelectasis, few foci of ground-glass likely infectious, no PE, small volume ascites.  All labs and imaging discussed with the patient.  We will admit for further medical evaluation and treatment. I attest a sepsis perfusion exam was performed within 6 hours of sepsis, severe sepsis, or septic shock presentation, following fluid resuscitation.  I have spoken with the patient and/or caregivers. I have explained the patient's condition, diagnoses and treatment plan based on the information available to me at this time. I have answered the patient's and/or caregiver's questions and addressed any concerns. The patient and/or caregivers have as good an understanding of the patient's diagnosis, condition and treatment plan as can be expected at this point. The  patient has been stabilized within the capability of the emergency department. The patient will be transported for further care and management or will be moved to an observation or inpatient service. I have communicated with the staff or medical practitioner taking over this patient's care.    I have personally provided 40 minutes of critical care time exclusive of time spent on separately billable procedures. Time includes review of laboratory data, radiology results, discussion with consultants, and monitoring for potential decompensation. Interventions were performed as documented above.     Problems Addressed:  Atrial fibrillation with rapid ventricular response: complicated acute illness or injury with systemic symptoms that poses a threat to life or bodily functions     Details: Started on amiodarone drip.  We will defer anticoagulation to medicine team.  Community acquired pneumonia, unspecified laterality: complicated acute illness or injury with systemic symptoms that poses a threat to life or bodily functions     Details: Started on Rocephin and doxycycline empirically    Amount and/or Complexity of Data Reviewed  Labs: ordered. Decision-making details documented in ED Course.  Radiology: ordered. Decision-making details documented in ED Course.  ECG/medicine tests: ordered and independent interpretation performed. Decision-making details documented in ED Course.    Risk  Prescription drug management.  Drug therapy requiring intensive monitoring for toxicity.  Decision regarding hospitalization.    Critical Care  Total time providing critical care: 40 minutes      CLINICAL IMPRESSION:  1. Atrial fibrillation with RVR    2. Tachycardia    3. Pleural effusion, bilateral    4. Type 2 diabetes mellitus without complication, without long-term current use of insulin    5. Pneumonia due to infectious organism, unspecified laterality, unspecified part of lung    6. Atrial fibrillation with rapid ventricular  response    7. Sepsis, due to unspecified organism, unspecified whether acute organ dysfunction present    8. Community acquired pneumonia, unspecified laterality    9. Hypotension, unspecified hypotension type        DISPOSITION:   ED Disposition Condition    Observation                     Leonardo Montanez DO  06/07/23 2017

## 2023-06-07 NOTE — FIRST PROVIDER EVALUATION
Medical screening examination initiated.  I have conducted a focused provider triage encounter, findings are as follows:    Brief history of present illness:  SOB x 4 days. Recent COVID exposure    There were no vitals filed for this visit.    Pertinent physical exam:  HR 150s, EKG shows AF w/ RVR, SpO2 99%, no signs of respiratory distress    Brief workup plan:  labs, XR, EKG    Preliminary workup initiated; this workup will be continued and followed by the physician or advanced practice provider that is assigned to the patient when roomed.

## 2023-06-07 NOTE — ED NOTES
Dr doan stated to hold 250ml bolus until he does a bedside us of his ivc. Report given to hanna for room 634. Informed nurse of dr doan order.

## 2023-06-08 LAB
ALBUMIN SERPL-MCNC: 3.8 G/DL (ref 3.4–4.8)
ALBUMIN/GLOB SERPL: 1.3 RATIO (ref 1.1–2)
ALP SERPL-CCNC: 81 UNIT/L (ref 40–150)
ALT SERPL-CCNC: 29 UNIT/L (ref 0–55)
APTT PPP: 59.1 SECONDS
APTT PPP: 63.4 SECONDS
APTT PPP: 67.1 SECONDS
AST SERPL-CCNC: 37 UNIT/L (ref 5–34)
AV INDEX (PROSTH): 0.66
AV MEAN GRADIENT: 3 MMHG
AV PEAK GRADIENT: 4 MMHG
AV VALVE AREA: 3.07 CM2
AV VELOCITY RATIO: 0.67
BASOPHILS # BLD AUTO: 0.06 X10(3)/MCL
BASOPHILS NFR BLD AUTO: 0.4 %
BILIRUBIN DIRECT+TOT PNL SERPL-MCNC: 0.4 MG/DL
BSA FOR ECHO PROCEDURE: 2.15 M2
BUN SERPL-MCNC: 15.9 MG/DL (ref 8.4–25.7)
CALCIUM SERPL-MCNC: 9.6 MG/DL (ref 8.8–10)
CHLORIDE SERPL-SCNC: 100 MMOL/L (ref 98–107)
CHOLEST SERPL-MCNC: 125 MG/DL
CHOLEST/HDLC SERPL: 3 {RATIO} (ref 0–5)
CO2 SERPL-SCNC: 20 MMOL/L (ref 23–31)
CREAT SERPL-MCNC: 1.5 MG/DL (ref 0.73–1.18)
CV ECHO LV RWT: 0.41 CM
DOP CALC AO PEAK VEL: 0.95 M/S
DOP CALC AO VTI: 14.2 CM
DOP CALC LVOT AREA: 4.6 CM2
DOP CALC LVOT DIAMETER: 2.43 CM
DOP CALC LVOT PEAK VEL: 0.64 M/S
DOP CALC LVOT STROKE VOLUME: 43.57 CM3
DOP CALC MV VTI: 17.5 CM
DOP CALCLVOT PEAK VEL VTI: 9.4 CM
E WAVE DECELERATION TIME: 157.13 MSEC
E/A RATIO: 34.33
ECHO LV POSTERIOR WALL: 1.09 CM (ref 0.6–1.1)
EJECTION FRACTION: 15 %
EOSINOPHIL # BLD AUTO: 0.01 X10(3)/MCL (ref 0–0.9)
EOSINOPHIL NFR BLD AUTO: 0.1 %
ERYTHROCYTE [DISTWIDTH] IN BLOOD BY AUTOMATED COUNT: 12.2 % (ref 11.5–17)
FRACTIONAL SHORTENING: 8 % (ref 28–44)
GFR SERPLBLD CREATININE-BSD FMLA CKD-EPI: 53 MLS/MIN/1.73/M2
GLOBULIN SER-MCNC: 3 GM/DL (ref 2.4–3.5)
GLUCOSE SERPL-MCNC: 269 MG/DL (ref 82–115)
GRAM STN SPEC: NORMAL
HAV IGM SERPL QL IA: NONREACTIVE
HBV CORE IGM SERPL QL IA: NONREACTIVE
HBV SURFACE AG SERPL QL IA: NONREACTIVE
HCT VFR BLD AUTO: 46.8 % (ref 42–52)
HCV AB SERPL QL IA: NONREACTIVE
HDLC SERPL-MCNC: 38 MG/DL (ref 35–60)
HGB BLD-MCNC: 15.8 G/DL (ref 14–18)
HIV 1+2 AB+HIV1 P24 AG SERPL QL IA: NONREACTIVE
IMM GRANULOCYTES # BLD AUTO: 0.08 X10(3)/MCL (ref 0–0.04)
IMM GRANULOCYTES NFR BLD AUTO: 0.5 %
INTERVENTRICULAR SEPTUM: 1.05 CM (ref 0.6–1.1)
LACTATE SERPL-SCNC: 2.3 MMOL/L (ref 0.5–2.2)
LDLC SERPL CALC-MCNC: 76 MG/DL (ref 50–140)
LEFT ATRIUM SIZE: 4.52 CM
LEFT INTERNAL DIMENSION IN SYSTOLE: 4.94 CM (ref 2.1–4)
LEFT VENTRICLE DIASTOLIC VOLUME INDEX: 65.92 ML/M2
LEFT VENTRICLE DIASTOLIC VOLUME: 138.43 ML
LEFT VENTRICLE MASS INDEX: 106 G/M2
LEFT VENTRICLE SYSTOLIC VOLUME INDEX: 54.9 ML/M2
LEFT VENTRICLE SYSTOLIC VOLUME: 115.22 ML
LEFT VENTRICULAR INTERNAL DIMENSION IN DIASTOLE: 5.35 CM (ref 3.5–6)
LEFT VENTRICULAR MASS: 222.79 G
LV LATERAL E/E' RATIO: 17.17 M/S
LVOT MG: 0.72 MMHG
LVOT MV: 0.39 CM/S
LYMPHOCYTES # BLD AUTO: 3.99 X10(3)/MCL (ref 0.6–4.6)
LYMPHOCYTES NFR BLD AUTO: 24.2 %
MAGNESIUM SERPL-MCNC: 2.3 MG/DL (ref 1.6–2.6)
MCH RBC QN AUTO: 31.4 PG (ref 27–31)
MCHC RBC AUTO-ENTMCNC: 33.8 G/DL (ref 33–36)
MCV RBC AUTO: 93 FL (ref 80–94)
MONOCYTES # BLD AUTO: 1.09 X10(3)/MCL (ref 0.1–1.3)
MONOCYTES NFR BLD AUTO: 6.6 %
MRSA PCR SCRN (OHS): NOT DETECTED
MV MEAN GRADIENT: 3 MMHG
MV PEAK A VEL: 0.03 M/S
MV PEAK E VEL: 1.03 M/S
MV PEAK GRADIENT: 7 MMHG
MV STENOSIS PRESSURE HALF TIME: 45.57 MS
MV VALVE AREA BY CONTINUITY EQUATION: 2.49 CM2
MV VALVE AREA P 1/2 METHOD: 4.83 CM2
NEUTROPHILS # BLD AUTO: 11.25 X10(3)/MCL (ref 2.1–9.2)
NEUTROPHILS NFR BLD AUTO: 68.2 %
NRBC BLD AUTO-RTO: 0 %
PHOSPHATE SERPL-MCNC: 4.2 MG/DL (ref 2.3–4.7)
PISA TR MAX VEL: 2.7 M/S
PLATELET # BLD AUTO: 217 X10(3)/MCL (ref 130–400)
PMV BLD AUTO: 11.4 FL (ref 7.4–10.4)
POCT GLUCOSE: 256 MG/DL (ref 70–110)
POCT GLUCOSE: 351 MG/DL (ref 70–110)
POCT GLUCOSE: 363 MG/DL (ref 70–110)
POTASSIUM SERPL-SCNC: 4.7 MMOL/L (ref 3.5–5.1)
PROT SERPL-MCNC: 6.8 GM/DL (ref 5.8–7.6)
RA PRESSURE: 15 MMHG
RBC # BLD AUTO: 5.03 X10(6)/MCL (ref 4.7–6.1)
RIGHT VENTRICULAR END-DIASTOLIC DIMENSION: 4.05 CM
SODIUM SERPL-SCNC: 131 MMOL/L (ref 136–145)
T PALLIDUM AB SER QL: NONREACTIVE
TDI LATERAL: 0.06 M/S
TR MAX PG: 29 MMHG
TRIGL SERPL-MCNC: 55 MG/DL (ref 34–140)
TV REST PULMONARY ARTERY PRESSURE: 44 MMHG
VLDLC SERPL CALC-MCNC: 11 MG/DL
WBC # SPEC AUTO: 16.48 X10(3)/MCL (ref 4.5–11.5)

## 2023-06-08 PROCEDURE — 63600175 PHARM REV CODE 636 W HCPCS: Performed by: INTERNAL MEDICINE

## 2023-06-08 PROCEDURE — 83735 ASSAY OF MAGNESIUM: CPT | Performed by: STUDENT IN AN ORGANIZED HEALTH CARE EDUCATION/TRAINING PROGRAM

## 2023-06-08 PROCEDURE — 97165 OT EVAL LOW COMPLEX 30 MIN: CPT

## 2023-06-08 PROCEDURE — 25000003 PHARM REV CODE 250

## 2023-06-08 PROCEDURE — 85730 THROMBOPLASTIN TIME PARTIAL: CPT | Performed by: INTERNAL MEDICINE

## 2023-06-08 PROCEDURE — 85025 COMPLETE CBC W/AUTO DIFF WBC: CPT | Performed by: STUDENT IN AN ORGANIZED HEALTH CARE EDUCATION/TRAINING PROGRAM

## 2023-06-08 PROCEDURE — 63600175 PHARM REV CODE 636 W HCPCS

## 2023-06-08 PROCEDURE — 25000003 PHARM REV CODE 250: Performed by: INTERNAL MEDICINE

## 2023-06-08 PROCEDURE — 83605 ASSAY OF LACTIC ACID: CPT | Performed by: STUDENT IN AN ORGANIZED HEALTH CARE EDUCATION/TRAINING PROGRAM

## 2023-06-08 PROCEDURE — 63600175 PHARM REV CODE 636 W HCPCS: Performed by: STUDENT IN AN ORGANIZED HEALTH CARE EDUCATION/TRAINING PROGRAM

## 2023-06-08 PROCEDURE — 63600175 PHARM REV CODE 636 W HCPCS: Performed by: EMERGENCY MEDICINE

## 2023-06-08 PROCEDURE — 80074 ACUTE HEPATITIS PANEL: CPT | Performed by: STUDENT IN AN ORGANIZED HEALTH CARE EDUCATION/TRAINING PROGRAM

## 2023-06-08 PROCEDURE — 80053 COMPREHEN METABOLIC PANEL: CPT | Performed by: STUDENT IN AN ORGANIZED HEALTH CARE EDUCATION/TRAINING PROGRAM

## 2023-06-08 PROCEDURE — 21400001 HC TELEMETRY ROOM

## 2023-06-08 PROCEDURE — 94761 N-INVAS EAR/PLS OXIMETRY MLT: CPT

## 2023-06-08 PROCEDURE — 86780 TREPONEMA PALLIDUM: CPT | Performed by: STUDENT IN AN ORGANIZED HEALTH CARE EDUCATION/TRAINING PROGRAM

## 2023-06-08 PROCEDURE — 93005 ELECTROCARDIOGRAM TRACING: CPT

## 2023-06-08 PROCEDURE — 96372 THER/PROPH/DIAG INJ SC/IM: CPT | Performed by: STUDENT IN AN ORGANIZED HEALTH CARE EDUCATION/TRAINING PROGRAM

## 2023-06-08 PROCEDURE — 25000003 PHARM REV CODE 250: Performed by: STUDENT IN AN ORGANIZED HEALTH CARE EDUCATION/TRAINING PROGRAM

## 2023-06-08 PROCEDURE — 87389 HIV-1 AG W/HIV-1&-2 AB AG IA: CPT | Performed by: STUDENT IN AN ORGANIZED HEALTH CARE EDUCATION/TRAINING PROGRAM

## 2023-06-08 RX ORDER — TRAZODONE HYDROCHLORIDE 100 MG/1
300 TABLET ORAL NIGHTLY
Status: DISCONTINUED | OUTPATIENT
Start: 2023-06-08 | End: 2023-06-12 | Stop reason: HOSPADM

## 2023-06-08 RX ORDER — FUROSEMIDE 10 MG/ML
20 INJECTION INTRAMUSCULAR; INTRAVENOUS ONCE
Status: COMPLETED | OUTPATIENT
Start: 2023-06-08 | End: 2023-06-08

## 2023-06-08 RX ORDER — SCOLOPAMINE TRANSDERMAL SYSTEM 1 MG/1
1 PATCH, EXTENDED RELEASE TRANSDERMAL ONCE
Status: COMPLETED | OUTPATIENT
Start: 2023-06-08 | End: 2023-06-11

## 2023-06-08 RX ADMIN — SCOPOLAMINE 1 PATCH: 1 PATCH TRANSDERMAL at 08:06

## 2023-06-08 RX ADMIN — INSULIN ASPART 3 UNITS: 100 INJECTION, SOLUTION INTRAVENOUS; SUBCUTANEOUS at 08:06

## 2023-06-08 RX ADMIN — AZITHROMYCIN MONOHYDRATE 500 MG: 500 INJECTION, POWDER, LYOPHILIZED, FOR SOLUTION INTRAVENOUS at 04:06

## 2023-06-08 RX ADMIN — INSULIN ASPART 3 UNITS: 100 INJECTION, SOLUTION INTRAVENOUS; SUBCUTANEOUS at 09:06

## 2023-06-08 RX ADMIN — SODIUM CHLORIDE, POTASSIUM CHLORIDE, SODIUM LACTATE AND CALCIUM CHLORIDE: 600; 310; 30; 20 INJECTION, SOLUTION INTRAVENOUS at 03:06

## 2023-06-08 RX ADMIN — LAMOTRIGINE 200 MG: 100 TABLET ORAL at 08:06

## 2023-06-08 RX ADMIN — HEPARIN SODIUM 12 UNITS/KG/HR: 10000 INJECTION, SOLUTION INTRAVENOUS at 04:06

## 2023-06-08 RX ADMIN — AMIODARONE HYDROCHLORIDE 0.5 MG/MIN: 1.8 INJECTION, SOLUTION INTRAVENOUS at 06:06

## 2023-06-08 RX ADMIN — VANCOMYCIN HYDROCHLORIDE 1750 MG: 1 INJECTION, POWDER, LYOPHILIZED, FOR SOLUTION INTRAVENOUS at 07:06

## 2023-06-08 RX ADMIN — ASPIRIN 81 MG: 81 TABLET, COATED ORAL at 08:06

## 2023-06-08 RX ADMIN — TRAZODONE HYDROCHLORIDE 300 MG: 100 TABLET ORAL at 08:06

## 2023-06-08 RX ADMIN — ATORVASTATIN CALCIUM 20 MG: 20 TABLET, FILM COATED ORAL at 08:06

## 2023-06-08 RX ADMIN — MIRTAZAPINE 30 MG: 30 TABLET, FILM COATED ORAL at 08:06

## 2023-06-08 RX ADMIN — PIPERACILLIN AND TAZOBACTAM 4.5 G: 4; .5 INJECTION, POWDER, LYOPHILIZED, FOR SOLUTION INTRAVENOUS; PARENTERAL at 06:06

## 2023-06-08 RX ADMIN — FUROSEMIDE 20 MG: 10 INJECTION, SOLUTION INTRAMUSCULAR; INTRAVENOUS at 05:06

## 2023-06-08 RX ADMIN — TRAZODONE HYDROCHLORIDE 300 MG: 100 TABLET ORAL at 02:06

## 2023-06-08 RX ADMIN — DULOXETINE 120 MG: 30 CAPSULE, DELAYED RELEASE ORAL at 08:06

## 2023-06-08 RX ADMIN — PANTOPRAZOLE SODIUM 40 MG: 40 TABLET, DELAYED RELEASE ORAL at 08:06

## 2023-06-08 RX ADMIN — PIPERACILLIN AND TAZOBACTAM 4.5 G: 4; .5 INJECTION, POWDER, LYOPHILIZED, FOR SOLUTION INTRAVENOUS; PARENTERAL at 10:06

## 2023-06-08 NOTE — PT/OT/SLP PROGRESS
Physical Therapy      Patient Name:  Alphonso Wyatt   MRN:  [id number]    Patient not seen today secondary to Other (Comment) (Pt is COVID pending. Awaiting rule out). Will follow-up 06/09/2023 as ordered and if pt is appropriate.

## 2023-06-08 NOTE — PROGRESS NOTES
"Inpatient Nutrition Evaluation    Admit Date: 2023   Total duration of encounter: 1 day    Nutrition Recommendation/Prescription     Continue Diabetic, Heart Healthy diet as ordered  Monitor Weights Weekly     Nutrition Assessment     Chart Review    Reason Seen: continuous nutrition monitoring    Malnutrition Screening Tool Results   Have you recently lost weight without trying?: No  Have you been eating poorly because of a decreased appetite?: No   MST Score: 0     Diagnosis:  New onset Afib with RVR, Sepsis, Lactic acidosis, CAP vs HAP, Suspected new onset CHF, B/L Pleural Effusions, DM    Relevant Medical History: HTN, DM    Nutrition-Related Medications: LR @ 50 ml/hr, ASA, Zithromax, Protonix, Zosyn, Vanc    Nutrition-Related Labs:  23 -- Glu 269 H, Na 131, K 4.7, BUN 15.9, Cr 1.5, Hgb A1C 10.4    Diet Order: Diet heart healthy Diabetic  Oral Supplement Order: none  Appetite/Oral Intake: good/% of meals  Factors Affecting Nutritional Intake: none identified  Food/Jew/Cultural Preferences:  No Fish  Food Allergies: none reported       Wound(s):   skin intact    Comments    23 -- Pt reports appetite good this am, reports decreased prior to admit since Friday; nausea associated with phlegm, no vomiting; LBM ; pt denies wt loss    Anthropometrics    Height: 5' 9" (175.3 cm) Height Method: Stated  Last Weight: 94.8 kg (209 lb) (23 0747) Weight Method: Bed Scale  BMI (Calculated): 30.8  BMI Classification: obese grade I (BMI 30-34.9)        Ideal Body Weight (IBW), Male: 160 lb     % Ideal Body Weight, Male (lb): 130.63 %                 Usual Body Weight (UBW), k.9 kg  % Usual Body Weight: 104.51     Usual Weight Provided By: patient    Wt Readings from Last 5 Encounters:   23 94.8 kg (209 lb)     Weight Change(s) Since Admission:  Admit Weight: 95 kg (209 lb 7 oz) (23 1032)  Current wt elevated related to fluid    Patient Education    Not applicable.    Monitoring " & Evaluation     Dietitian will monitor food and beverage intake, weight change, and glucose/endocrine profile.  Nutrition Risk/Follow-Up: low (follow-up in 5-7 days)  Patients assigned 'low nutrition risk' status do not qualify for a full nutritional assessment but will be monitored and re-evaluated in a 5-7 day time period. Please consult if re-evaluation needed sooner.

## 2023-06-08 NOTE — NURSING
This nurse was notified via Telemetry monitoring that the pt has a current rhythm of a-fib with rvr. Pt lying in bed. Stated he feels fine. Denies chest pain. V/S /74  R 16 temp 97.7 O2 sat 92%. This nurse placed pt on O2 at 1L/nc. Dr. Polo notified of tele findings and pt status. No new orders at this time. Will continue to monitor. No acute distress noted. Pt continues Hep drip and Amiodarone drip.

## 2023-06-08 NOTE — CONSULTS
" [facility] Cardiology Consult Note     Cardiology Attending: Dr. Lau  Cardiology Fellow: Duarte Zhong MD     Date of Admit: 6/7/2023  Date of Consult: 6/8/2023    Reason for Consultation:     Afib with RVR    History of Present Illness:      Alphonso Wyatt is a 62 y.o. male with a PMH significant for HTN, DM2 who presented with shortness of breath and cough. 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 His home COVID test was positive about 10 days ago, but on arrival here, his COVID test was negative. Here, incidentally found to be in Afib with RVR with HR in the 140s on arrival. He was given IV metoprolol which made him hypotensive, hence started on amiodarone gtt. He is currently being treated for sepsis due to pneumonia. There is evidence of new-onset HF, given b/l pleural effusions and . Troponin is negative. ECG with Afib RVR but no ischemic changes.     Today, the pt reports profound orthopnea, nausea, poor appetite, abdominal distension, and as though "he is drowning." He occasionally has episodes of flushing, weakness and simply not feeling well. No chest pain.     Past Medical History:     Past Medical History:   Diagnosis Date    Diabetes mellitus     Hypertension      Surgical History:   History reviewed. No pertinent surgical history.  Allergies:   Review of patient's allergies indicates:  No Known Allergies  Family History:   History reviewed. No pertinent family history.  Social History:     Social History     Tobacco Use    Smoking status: Every Day     Types: Cigarettes    Smokeless tobacco: Never     Review of Systems:     Constitutional: Denies fevers, chills, night sweats, anorexia, unexplained weight loss, malaise, fatigue  Eyes: Denies blurry vision, eye pain, floaters  ENT: Denies rhinorrhea, epistaxis, tinnitus, ear pain, sore throat, dysphagia, odynophagia   Gastrointestinal:  Denies dysphagia, nausea, vomiting, " diarrhea, constipation, abdominal pain, melena, BRBPR, hematemesis, constipation, ascites  Genitourinary:  Denies dysuria, discharge, incontinence, hematuria, polyuria  Musculoskeletal:  Denies pain, stiffness, decreased ROM  Neurological: Denies any changes in sensation, weakness, seizures, headache,  parasthesias, confusion   Psych: Euthymic, denies hallucinations, SI/HI  Endocrine: Denies cold/heat intolerance, polyuria, polydypsia  Skin: Denies rash, jaundice, pruritis, wounds, lesions    Medications:     Home Medications:  Prior to Admission medications    Medication Sig Start Date End Date Taking? Authorizing Provider   ascorbic acid, vitamin C, (VITAMIN C) 500 MG tablet Take 500 mg by mouth 2 (two) times daily.    Historical Provider   aspirin (ECOTRIN) 81 MG EC tablet Take 81 mg by mouth once daily.    Historical Provider   atorvastatin (LIPITOR) 20 MG tablet Take 20 mg by mouth nightly. 6/6/23   Historical Provider   DULoxetine (CYMBALTA) 60 MG capsule Take 120 mg by mouth once daily. 5/15/23   Historical Provider   lamoTRIgine (LAMICTAL) 200 MG tablet Take 200 mg by mouth once daily. 5/15/23   Historical Provider   loratadine (CLARITIN) 10 mg tablet Take 10 mg by mouth once daily.    Historical Provider   metFORMIN (GLUCOPHAGE) 1000 MG tablet Take 1,000 mg by mouth 2 (two) times daily. 6/2/23   Historical Provider   mirtazapine (REMERON) 30 MG tablet Take 30 mg by mouth every evening. 5/15/23   Historical Provider   montelukast (SINGULAIR) 10 mg tablet Take 10 mg by mouth every evening.    Historical Provider   omega-3 fatty acids/fish oil (FISH OIL-OMEGA-3 FATTY ACIDS) 300-1,000 mg capsule Take 1 capsule by mouth 2 (two) times a day.    Historical Provider   ramipriL (ALTACE) 10 MG capsule Take 10 mg by mouth once daily. 6/6/23   Historical Provider   traZODone (DESYREL) 150 MG tablet Take 450 mg by mouth every evening. 5/15/23   Historical Provider       Current/Inpatient Medications:  Infusions:    "amiodarone in dextrose 5% 0.5 mg/min (06/08/23 0615)    heparin (porcine) in D5W 12 Units/kg/hr (06/08/23 3576)     Scheduled:   aspirin  81 mg Oral Daily    atorvastatin  20 mg Oral Nightly    azithromycin  500 mg Intravenous Q24H    DULoxetine  120 mg Oral Daily    lamoTRIgine  200 mg Oral Daily    mirtazapine  30 mg Oral QHS    pantoprazole  40 mg Oral Daily    piperacillin-tazobactam (Zosyn) IV (PEDS and ADULTS) (extended infusion is not appropriate)  4.5 g Intravenous Q8H    traZODone  300 mg Oral QHS    [START ON 6/9/2023] vancomycin (VANCOCIN) IVPB  1,500 mg Intravenous Q24H     PRN:  dextrose 10%, dextrose 10%, dextrose, dextrose, glucagon (human recombinant), heparin (PORCINE), heparin (PORCINE), insulin aspart U-100, naloxone, Pharmacy to dose Vancomycin consult **AND** vancomycin - pharmacy to dose    Objective:     24-hour Vitals:   Temp:  [97.5 °F (36.4 °C)-98.2 °F (36.8 °C)] 97.5 °F (36.4 °C)  Pulse:  [] 117  Resp:  [17-21] 18  SpO2:  [92 %-100 %] 100 %  BP: ()/(74-87) 127/87    Vitals: /87   Pulse (!) 117   Temp 97.5 °F (36.4 °C) (Oral)   Resp 18   Ht 5' 9" (1.753 m)   Wt 94.8 kg (209 lb)   SpO2 100%   BMI 30.86 kg/m²     Intake/Output Summary (Last 24 hours) at 6/8/2023 1628  Last data filed at 6/8/2023 0509  Gross per 24 hour   Intake 1408.03 ml   Output 200 ml   Net 1208.03 ml       Physical Exam:  Gen: appears uncomfortable. Sitting in side of bed, mildly tachypneic  HEENT: NC, AT, EOMI.   Neck: NO JVD, supple, no thyromegaly.   CV: irregular rhythm, Normal S1 and S2, No murmurs, rubs or gallops.   Chest: CTA B/L.    Abd: distended   Extr: mild edema, no cyanosis, +2 pulse equal and bilateral.   Neuro: AOx3,  No focal neurological deficit.   Skin: no rashes.     Labs:   I have reviewed the following labs below:    Recent Labs   Lab 06/07/23  1048 06/08/23  0241   WBC 12.36* 16.48*   HGB 16.1 15.8   HCT 48.4 46.8    217     Recent Labs   Lab 06/07/23  1048 " 06/08/23  0241    131*   K 4.1 4.7   CO2 26 20*   BUN 9.6 15.9   CREATININE 1.08 1.50*   CALCIUM 10.1* 9.6   MG 1.60 2.30   PHOS 4.2  --      Recent Labs   Lab 06/07/23  1048 06/08/23  0241   ALBUMIN 4.2 3.8   BILITOT 0.6 0.4   AST 26 37*   ALT 27 29   ALKPHOS 80 81     Recent Labs   Lab 06/07/23  1048 06/07/23  1953 06/08/23  0241 06/08/23  1023   PTT 33.5 33.7 59.1 67.1   INR 1.15  --   --   --      Recent Labs   Lab 06/07/23  1048   TROPONINI 0.040   .9*       Cardiovascular Studies/Imaging:   I have reviewed the following studies below:    Echo  6/8/23  The left ventricle is mildly enlarged with concentric remodeling and severely decreased systolic function.  The estimated ejection fraction is 15%.  Atrial fibrillation observed.  Mild right ventricular enlargement with moderately reduced right ventricular systolic function.  Moderate right atrial enlargement.  Mild mitral regurgitation.  Elevated central venous pressure (15 mmHg).  The estimated PA systolic pressure is 44 mmHg.  There is mild pulmonary hypertension.  Mild left atrial enlargement.  Mild tricuspid regurgitation.    Assessment:     Alphonso Wyatt is a 62 y.o. male with a PMH significant for HTN, DM2 who presented with shortness of breath and cough. Recently tested positive for COVID at home, but tested negative here.  Incidentally found to be in Afib with RVR with HR in the 140s on arrival. He was given IV metoprolol which made him hypotensive, hence started on amiodarone gtt. He is currently being treated for sepsis due to pneumonia. There is question of new-onset HF, given b/l pleural effusions and . Troponin is negative. ECG with Afib RVR but no ischemic changes.     Plan/Recommendations:     # New onset acute systolic dysfunction (EF 15%)  # Suspect non-ischemic cardiomyopathy (possibly viral myocarditis vs other)  Newly discovered low EF. Most likely NICM, however ischemia and other non-ischemic etiologies should be ruled  out.   - Patient is volume overloaded given his orthopnea, b/l pleural effusion and abdominal distension. Likely has bowel edema given his nausea.    - Diurese with IV lasix 20 to 40 mg (have ordered lasix 20 mg IV x1 for now). Goal I/O is net -1 to 1.5L /24 hours   - Strict I&O.    - Monitor creatinine closely.    - IF BP drops with diuretics, may have to transfer to ICU and start inotropes.    - If the pt goes into resp distress, pt may benefit from CPAP, as auto PEEP will help with HF.    - For now, will avoid PO meds as he may not absorb them well with his bowel edema.   - Treat with GDMT: Start with Entresto, Toprol XL. Can gradually add Jardiance and spironolactone as BP and renal function will allow.   - He will need coronary angiogram as an outpatient once his current respiratory status is better and he is no longer orthopneic.     # New-onset Afib  - Currently on amiodarone drip. Continue the same for now. Once the pt is no longer nauseous, can start PO amiodarone taper (400 mg BID x1 week --> 200 mg BID x1 week --> 200 mg daily thereafter).  - Continue heparin gtt for now. Once pt is no longer nauseous, can switch to PO Eliquis 5 mg BID.   - For rate control, can give Toprol XL 50 mg daily and titrate up as BP will allow.       Duarte Zhong MD, MSCI  [facility] Cardiology Fellow  06/08/2023 4:28 PM  [facility]

## 2023-06-08 NOTE — NURSING
Pt c/o indigestion/heartburn. Dr. Polo notified. No acute distress noted. Continues Amiodarone drip and Hep drip. Will continue to monitor.

## 2023-06-08 NOTE — NURSING
"Pt c/o severe SOB when he attempts to lay flat in bed. States he has to elevate his HOB or sit up at the edge of bed. States he feels "pressure" on his chest, when lying down. Dr. Polo notified.  "

## 2023-06-08 NOTE — NURSING
Dr. Polo notified of pt restlessness. Pt sitting at the edge of bed at this time. States he cannot sleep due to feeling SOB when laying in bed with his HOB elevated.

## 2023-06-08 NOTE — PT/OT/SLP EVAL
"Occupational Therapy   Evaluation and Discharge Note    Name: Alphonso Wyatt  MRN: [id number]  Admitting Diagnosis: new onset afib with RVR, new onset CHF, pneumonia and sepsis  Recent Surgery: * No surgery found *      Recommendations:     Discharge Recommendations: home  Discharge Equipment Recommendations: none  Barriers to discharge:  None    Assessment:     Alphonso Wyatt is a 62 y.o. male with a medical diagnosis of There is no problem list on file for this patient.     At this time, patient is functioning at their prior level of function and does not require further acute OT services.     Plan:     During this hospitalization, patient does not require further acute OT services.  Please re-consult if situation changes.    Plan of Care Reviewed with: patient    Subjective     Chief Complaint: SOB causing discomfort at all times, especially with "laying back"  Patient/Family Comments/goals: "I just hope I don't have to have heart surgery"    Occupational Profile:  Living Environment: trailer, alone, 4 steps to enter, R handrail  Previous level of function: I with ADLs and mobility  Roles and Routines: works as a nurse at [employer], drives, performs all IADLs independently  Equipment Used at home: none  Assistance upon Discharge: no anticipated need once medical issues resolve    Pain/Comfort:  Pain Rating 1: 0/10  Pain Rating Post-Intervention 1: 0/10    Patients cultural, spiritual, Latter day conflicts given the current situation: no    Objective:     Communicated with: nurse Blanchard prior to session.  Patient found sitting edge of bed with peripheral IV, telemetry, oxygen upon OT entry to room.    General Precautions: Standard,    Orthopedic Precautions: N/A  Braces: N/A  Respiratory Status: Nasal cannula, flow 1 L/min   Initial vital signs:  O2 sat 98%,   Final vital signs after ambulating 130 ft:  O2 sat 99%,     Occupational Performance:    Bed Mobility:    Pt sitting EOB at initiation of tx d/t " SOB/discomfort with reclining    Functional Mobility/Transfers:  Patient completed Sit <> Stand Transfer with independence  with  no assistive device   Functional Mobility: SPV to ambulate around nurses' station 130 ft with no device and no noted LoB; O2 sats maintained on 1 L O2 nasal cannula    Activities of Daily Living:  Modified independent, taking rest breaks as needed    Cognitive/Visual Perceptual:  Cognitive/Psychosocial Skills:     -       Oriented to: Person, Place, Time, and Situation   -       Follows Commands/attention:Follows multistep  commands  -       Safety awareness/insight to disability: intact     Physical Exam:  BUE AROM WNL, strength 4+/5 and symmetrical, FM coordination intact    Treatment & Education:  Pt. educated on orientation to environment, use of call bell for assist as needed, okay to ambulate in room with assist to manage multiple lines (IV pole, oxygen).  Pt demonstrated understanding.      Patient left sitting edge of bed with all lines intact, call button in reach, nurse notified, and family member present    GOALS:   Multidisciplinary Problems       Occupational Therapy Goals       Not on file                    History:     Past Medical History:   Diagnosis Date    Diabetes mellitus     Hypertension        History reviewed. No pertinent surgical history.    Time Tracking:     OT Date of Treatment: 06/08/23  OT Start Time: 1305  OT Stop Time: 1331  OT Total Time (min): 26 min    Billable Minutes:Evaluation 26    6/8/2023

## 2023-06-09 LAB
A-ADO2 BLOOD GAS (OHS): 79 MMHG
ALBUMIN SERPL-MCNC: 3.4 G/DL (ref 3.4–4.8)
ALBUMIN/GLOB SERPL: 1.3 RATIO (ref 1.1–2)
ALP SERPL-CCNC: 75 UNIT/L (ref 40–150)
ALT SERPL-CCNC: 108 UNIT/L (ref 0–55)
APTT PPP: 117.8 SECONDS
APTT PPP: 33 SECONDS
AST SERPL-CCNC: 229 UNIT/L (ref 5–34)
B PERT.PT PRMT NPH QL NAA+NON-PROBE: NOT DETECTED
BACTERIA SPEC CULT: NORMAL
BASE EXCESS BLD CALC-SCNC: -4.1 MMOL/L (ref -2–2)
BASOPHILS # BLD AUTO: 0.03 X10(3)/MCL
BASOPHILS NFR BLD AUTO: 0.2 %
BILIRUBIN DIRECT+TOT PNL SERPL-MCNC: 0.4 MG/DL
BLOOD GAS SAMPLE TYPE (OHS): ABNORMAL
BUN SERPL-MCNC: 26.8 MG/DL (ref 8.4–25.7)
C PNEUM DNA NPH QL NAA+NON-PROBE: NOT DETECTED
CALCIUM SERPL-MCNC: 8.3 MG/DL (ref 8.8–10)
CHLORIDE SERPL-SCNC: 98 MMOL/L (ref 98–107)
CO2 BLDA-SCNC: 17.8 MMOL/L (ref 22–26)
CO2 SERPL-SCNC: 17 MMOL/L (ref 23–31)
COHGB MFR BLDA: 0.8 % (ref 0.5–1.5)
CREAT SERPL-MCNC: 1.45 MG/DL (ref 0.73–1.18)
EOSINOPHIL # BLD AUTO: 0.01 X10(3)/MCL (ref 0–0.9)
EOSINOPHIL NFR BLD AUTO: 0.1 %
ERYTHROCYTE [DISTWIDTH] IN BLOOD BY AUTOMATED COUNT: 11.9 % (ref 11.5–17)
FIO2 BLOOD GAS (OHS): 30 %
GFR SERPLBLD CREATININE-BSD FMLA CKD-EPI: 55 MLS/MIN/1.73/M2
GLOBULIN SER-MCNC: 2.6 GM/DL (ref 2.4–3.5)
GLUCOSE SERPL-MCNC: 360 MG/DL (ref 82–115)
HADV DNA NPH QL NAA+NON-PROBE: NOT DETECTED
HCO3 BLDA-SCNC: 17.1 MMOL/L (ref 22–26)
HCOV 229E RNA NPH QL NAA+NON-PROBE: NOT DETECTED
HCOV HKU1 RNA NPH QL NAA+NON-PROBE: NOT DETECTED
HCOV NL63 RNA NPH QL NAA+NON-PROBE: NOT DETECTED
HCOV OC43 RNA NPH QL NAA+NON-PROBE: NOT DETECTED
HCT VFR BLD AUTO: 44.2 % (ref 42–52)
HGB BLD-MCNC: 15.2 G/DL (ref 14–18)
HMPV RNA NPH QL NAA+NON-PROBE: NOT DETECTED
HPIV1 RNA NPH QL NAA+NON-PROBE: NOT DETECTED
HPIV2 RNA NPH QL NAA+NON-PROBE: NOT DETECTED
HPIV3 RNA NPH QL NAA+NON-PROBE: NOT DETECTED
HPIV4 RNA NPH QL NAA+NON-PROBE: NOT DETECTED
IMM GRANULOCYTES # BLD AUTO: 0.07 X10(3)/MCL (ref 0–0.04)
IMM GRANULOCYTES NFR BLD AUTO: 0.5 %
LYMPHOCYTES # BLD AUTO: 3.31 X10(3)/MCL (ref 0.6–4.6)
LYMPHOCYTES NFR BLD AUTO: 23.6 %
M PNEUMO DNA NPH QL NAA+NON-PROBE: NOT DETECTED
MAGNESIUM SERPL-MCNC: 2 MG/DL (ref 1.6–2.6)
MCH RBC QN AUTO: 31.3 PG (ref 27–31)
MCHC RBC AUTO-ENTMCNC: 34.4 G/DL (ref 33–36)
MCV RBC AUTO: 91.1 FL (ref 80–94)
METHGB MFR BLDA: 0.8 % (ref 0–1.5)
MONOCYTES # BLD AUTO: 0.92 X10(3)/MCL (ref 0.1–1.3)
MONOCYTES NFR BLD AUTO: 6.6 %
NEUTROPHILS # BLD AUTO: 9.7 X10(3)/MCL (ref 2.1–9.2)
NEUTROPHILS NFR BLD AUTO: 69 %
NRBC BLD AUTO-RTO: 0 %
O2 HB BLOOD GAS (OHS): 96.3 % (ref 94–100)
OXYGEN DEVICE BLOOD GAS (OHS): ABNORMAL
OXYHGB MFR BLDA: 15.8 G/DL (ref 12–18)
PAO2 BLOOD GAS (OHS): 185 MMHG
PATH REV: NORMAL
PCO2 BLDA: 23 MMHG (ref 35–45)
PH BLDA: 7.48 [PH] (ref 7.35–7.45)
PHOSPHATE SERPL-MCNC: 5.6 MG/DL (ref 2.3–4.7)
PLATELET # BLD AUTO: 175 X10(3)/MCL (ref 130–400)
PMV BLD AUTO: 11.3 FL (ref 7.4–10.4)
PO2 BLDA: 106 MMHG (ref 75–100)
POCT GLUCOSE: 121 MG/DL (ref 70–110)
POCT GLUCOSE: 150 MG/DL (ref 70–110)
POCT GLUCOSE: 253 MG/DL (ref 70–110)
POCT GLUCOSE: 293 MG/DL (ref 70–110)
POTASSIUM SERPL-SCNC: 4.5 MMOL/L (ref 3.5–5.1)
PROT SERPL-MCNC: 6 GM/DL (ref 5.8–7.6)
RBC # BLD AUTO: 4.85 X10(6)/MCL (ref 4.7–6.1)
RSV RNA NPH QL NAA+NON-PROBE: NOT DETECTED
RV+EV RNA NPH QL NAA+NON-PROBE: NOT DETECTED
SAO2 % BLDA: 97.9 %
SODIUM SERPL-SCNC: 126 MMOL/L (ref 136–145)
WBC # SPEC AUTO: 14.04 X10(3)/MCL (ref 4.5–11.5)

## 2023-06-09 PROCEDURE — 85730 THROMBOPLASTIN TIME PARTIAL: CPT | Performed by: STUDENT IN AN ORGANIZED HEALTH CARE EDUCATION/TRAINING PROGRAM

## 2023-06-09 PROCEDURE — 94761 N-INVAS EAR/PLS OXIMETRY MLT: CPT

## 2023-06-09 PROCEDURE — 63600175 PHARM REV CODE 636 W HCPCS: Performed by: EMERGENCY MEDICINE

## 2023-06-09 PROCEDURE — 97161 PT EVAL LOW COMPLEX 20 MIN: CPT

## 2023-06-09 PROCEDURE — 25000003 PHARM REV CODE 250

## 2023-06-09 PROCEDURE — 63600175 PHARM REV CODE 636 W HCPCS: Performed by: INTERNAL MEDICINE

## 2023-06-09 PROCEDURE — 80053 COMPREHEN METABOLIC PANEL: CPT | Performed by: STUDENT IN AN ORGANIZED HEALTH CARE EDUCATION/TRAINING PROGRAM

## 2023-06-09 PROCEDURE — 21400001 HC TELEMETRY ROOM

## 2023-06-09 PROCEDURE — 83735 ASSAY OF MAGNESIUM: CPT | Performed by: STUDENT IN AN ORGANIZED HEALTH CARE EDUCATION/TRAINING PROGRAM

## 2023-06-09 PROCEDURE — 94760 N-INVAS EAR/PLS OXIMETRY 1: CPT

## 2023-06-09 PROCEDURE — 99900035 HC TECH TIME PER 15 MIN (STAT)

## 2023-06-09 PROCEDURE — 85025 COMPLETE CBC W/AUTO DIFF WBC: CPT | Performed by: STUDENT IN AN ORGANIZED HEALTH CARE EDUCATION/TRAINING PROGRAM

## 2023-06-09 PROCEDURE — 82803 BLOOD GASES ANY COMBINATION: CPT

## 2023-06-09 PROCEDURE — P9047 ALBUMIN (HUMAN), 25%, 50ML: HCPCS | Mod: JZ,JG

## 2023-06-09 PROCEDURE — 27000221 HC OXYGEN, UP TO 24 HOURS

## 2023-06-09 PROCEDURE — 36600 WITHDRAWAL OF ARTERIAL BLOOD: CPT

## 2023-06-09 PROCEDURE — 84100 ASSAY OF PHOSPHORUS: CPT | Performed by: STUDENT IN AN ORGANIZED HEALTH CARE EDUCATION/TRAINING PROGRAM

## 2023-06-09 PROCEDURE — 85730 THROMBOPLASTIN TIME PARTIAL: CPT | Performed by: INTERNAL MEDICINE

## 2023-06-09 PROCEDURE — 87633 RESP VIRUS 12-25 TARGETS: CPT | Performed by: STUDENT IN AN ORGANIZED HEALTH CARE EDUCATION/TRAINING PROGRAM

## 2023-06-09 PROCEDURE — 63600175 PHARM REV CODE 636 W HCPCS: Performed by: STUDENT IN AN ORGANIZED HEALTH CARE EDUCATION/TRAINING PROGRAM

## 2023-06-09 PROCEDURE — 25000003 PHARM REV CODE 250: Performed by: INTERNAL MEDICINE

## 2023-06-09 PROCEDURE — 63600175 PHARM REV CODE 636 W HCPCS: Mod: JZ,JG

## 2023-06-09 PROCEDURE — 63600175 PHARM REV CODE 636 W HCPCS

## 2023-06-09 PROCEDURE — 25000003 PHARM REV CODE 250: Performed by: STUDENT IN AN ORGANIZED HEALTH CARE EDUCATION/TRAINING PROGRAM

## 2023-06-09 PROCEDURE — 87798 DETECT AGENT NOS DNA AMP: CPT | Performed by: STUDENT IN AN ORGANIZED HEALTH CARE EDUCATION/TRAINING PROGRAM

## 2023-06-09 PROCEDURE — 25000242 PHARM REV CODE 250 ALT 637 W/ HCPCS

## 2023-06-09 RX ORDER — DEXTROSE 40 %
30 GEL (GRAM) ORAL
Status: DISCONTINUED | OUTPATIENT
Start: 2023-06-09 | End: 2023-06-12 | Stop reason: HOSPADM

## 2023-06-09 RX ORDER — GLUCAGON 1 MG
1 KIT INJECTION
Status: DISCONTINUED | OUTPATIENT
Start: 2023-06-09 | End: 2023-06-12 | Stop reason: HOSPADM

## 2023-06-09 RX ORDER — IPRATROPIUM BROMIDE 0.5 MG/2.5ML
0.5 SOLUTION RESPIRATORY (INHALATION) ONCE
Status: DISCONTINUED | OUTPATIENT
Start: 2023-06-10 | End: 2023-06-10

## 2023-06-09 RX ORDER — INSULIN ASPART 100 [IU]/ML
1-10 INJECTION, SOLUTION INTRAVENOUS; SUBCUTANEOUS
Status: DISCONTINUED | OUTPATIENT
Start: 2023-06-09 | End: 2023-06-12 | Stop reason: HOSPADM

## 2023-06-09 RX ORDER — DEXTROSE 40 %
15 GEL (GRAM) ORAL
Status: DISCONTINUED | OUTPATIENT
Start: 2023-06-09 | End: 2023-06-12 | Stop reason: HOSPADM

## 2023-06-09 RX ORDER — LEVOFLOXACIN 5 MG/ML
750 INJECTION, SOLUTION INTRAVENOUS
Status: DISCONTINUED | OUTPATIENT
Start: 2023-06-10 | End: 2023-06-12 | Stop reason: HOSPADM

## 2023-06-09 RX ORDER — DIGOXIN 0.25 MG/ML
250 INJECTION INTRAMUSCULAR; INTRAVENOUS ONCE
Status: DISCONTINUED | OUTPATIENT
Start: 2023-06-09 | End: 2023-06-09

## 2023-06-09 RX ORDER — IPRATROPIUM BROMIDE AND ALBUTEROL SULFATE 2.5; .5 MG/3ML; MG/3ML
3 SOLUTION RESPIRATORY (INHALATION) ONCE
Status: COMPLETED | OUTPATIENT
Start: 2023-06-09 | End: 2023-06-09

## 2023-06-09 RX ORDER — ALBUMIN HUMAN 250 G/1000ML
12.5 SOLUTION INTRAVENOUS ONCE
Status: COMPLETED | OUTPATIENT
Start: 2023-06-09 | End: 2023-06-09

## 2023-06-09 RX ORDER — FUROSEMIDE 10 MG/ML
40 INJECTION INTRAMUSCULAR; INTRAVENOUS ONCE
Status: COMPLETED | OUTPATIENT
Start: 2023-06-09 | End: 2023-06-09

## 2023-06-09 RX ORDER — ALBUMIN HUMAN 50 G/1000ML
12.5 SOLUTION INTRAVENOUS ONCE
Status: DISCONTINUED | OUTPATIENT
Start: 2023-06-09 | End: 2023-06-09

## 2023-06-09 RX ORDER — MORPHINE SULFATE 2 MG/ML
1 INJECTION, SOLUTION INTRAMUSCULAR; INTRAVENOUS ONCE
Status: COMPLETED | OUTPATIENT
Start: 2023-06-09 | End: 2023-06-09

## 2023-06-09 RX ADMIN — LAMOTRIGINE 200 MG: 100 TABLET ORAL at 09:06

## 2023-06-09 RX ADMIN — MORPHINE SULFATE 1 MG: 2 INJECTION, SOLUTION INTRAMUSCULAR; INTRAVENOUS at 04:06

## 2023-06-09 RX ADMIN — ASPIRIN 81 MG: 81 TABLET, COATED ORAL at 09:06

## 2023-06-09 RX ADMIN — PANTOPRAZOLE SODIUM 40 MG: 40 TABLET, DELAYED RELEASE ORAL at 09:06

## 2023-06-09 RX ADMIN — AMIODARONE HYDROCHLORIDE 0.5 MG/MIN: 1.8 INJECTION, SOLUTION INTRAVENOUS at 05:06

## 2023-06-09 RX ADMIN — LEVOFLOXACIN 750 MG: 750 INJECTION, SOLUTION INTRAVENOUS at 11:06

## 2023-06-09 RX ADMIN — ALBUMIN (HUMAN) 12.5 G: 12.5 SOLUTION INTRAVENOUS at 01:06

## 2023-06-09 RX ADMIN — VANCOMYCIN HYDROCHLORIDE 1500 MG: 1 INJECTION, POWDER, LYOPHILIZED, FOR SOLUTION INTRAVENOUS at 05:06

## 2023-06-09 RX ADMIN — DULOXETINE 120 MG: 30 CAPSULE, DELAYED RELEASE ORAL at 09:06

## 2023-06-09 RX ADMIN — PIPERACILLIN AND TAZOBACTAM 4.5 G: 4; .5 INJECTION, POWDER, LYOPHILIZED, FOR SOLUTION INTRAVENOUS; PARENTERAL at 02:06

## 2023-06-09 RX ADMIN — AMIODARONE HYDROCHLORIDE 0.5 MG/MIN: 1.8 INJECTION, SOLUTION INTRAVENOUS at 04:06

## 2023-06-09 RX ADMIN — HEPARIN SODIUM 15 UNITS/KG/HR: 10000 INJECTION, SOLUTION INTRAVENOUS at 02:06

## 2023-06-09 RX ADMIN — ATORVASTATIN CALCIUM 20 MG: 20 TABLET, FILM COATED ORAL at 09:06

## 2023-06-09 RX ADMIN — FUROSEMIDE 40 MG: 10 INJECTION, SOLUTION INTRAMUSCULAR; INTRAVENOUS at 02:06

## 2023-06-09 RX ADMIN — INSULIN ASPART 6 UNITS: 100 INJECTION, SOLUTION INTRAVENOUS; SUBCUTANEOUS at 09:06

## 2023-06-09 RX ADMIN — INSULIN DETEMIR 15 UNITS: 100 INJECTION, SOLUTION SUBCUTANEOUS at 09:06

## 2023-06-09 RX ADMIN — TRAZODONE HYDROCHLORIDE 300 MG: 100 TABLET ORAL at 09:06

## 2023-06-09 RX ADMIN — PIPERACILLIN AND TAZOBACTAM 4.5 G: 4; .5 INJECTION, POWDER, LYOPHILIZED, FOR SOLUTION INTRAVENOUS; PARENTERAL at 09:06

## 2023-06-09 RX ADMIN — APIXABAN 5 MG: 2.5 TABLET, FILM COATED ORAL at 09:06

## 2023-06-09 RX ADMIN — IPRATROPIUM BROMIDE AND ALBUTEROL SULFATE 3 ML: 2.5; .5 SOLUTION RESPIRATORY (INHALATION) at 02:06

## 2023-06-09 RX ADMIN — FUROSEMIDE 40 MG: 10 INJECTION, SOLUTION INTRAMUSCULAR; INTRAVENOUS at 11:06

## 2023-06-09 RX ADMIN — MIRTAZAPINE 30 MG: 30 TABLET, FILM COATED ORAL at 09:06

## 2023-06-09 RX ADMIN — HEPARIN SODIUM 12 UNITS/KG/HR: 10000 INJECTION, SOLUTION INTRAVENOUS at 01:06

## 2023-06-09 RX ADMIN — INSULIN ASPART 6 UNITS: 100 INJECTION, SOLUTION INTRAVENOUS; SUBCUTANEOUS at 12:06

## 2023-06-09 NOTE — PLAN OF CARE
Problem: Physical Therapy  Goal: Physical Therapy Goal  Description: Goals to be met by: Discharge     Patient will increase functional independence with mobility by performin. Supine to sit with Modified [place]  2. Sit to supine with Modified [place]  3. Sit to stand transfer with Modified [place]  4. Bed to chair transfer with Modified [place] using No Assistive Device  5. Gait  x 400 feet with [place] using No Assistive Device.     Outcome: Ongoing, Progressing

## 2023-06-09 NOTE — NURSING
LeiaNP with cardiology aware of EKG per this nurse. Leia stated no ST segment elevation was noted so no new orders at this time.

## 2023-06-09 NOTE — PT/OT/SLP EVAL
Physical Therapy Evaluation    Patient Name:  Alphonso Wyatt   MRN:  [id number]    Recommendations:     Discharge Recommendations:  home   Discharge Equipment Recommendations: none   Barriers to discharge:  Lives at home alone and decreased endurance    Assessment:     Alphonso Wyatt is a 62 y.o. male admitted with a medical diagnosis of <principal problem not specified>.  He presents with the following impairments/functional limitations:  weakness, impaired endurance.    Rehab Prognosis: Good.    Patient would benefit from continued skilled acute PT services to: address above listed impairments/functional limitations; receive patient/caregiver education; reduce fall risk; and maximize independency/safety with functional mobility.    Recent Surgery: * No surgery found *      Plan:     During this hospitalization, patient to be seen 3 x/week to address the identified impairments/functional limitations via gait training, therapeutic activities, therapeutic exercises, neuromuscular re-education and progress toward the established goals.    Plan of Care Expires:  07/07/23    Subjective     Communicated with patient's nurse RN prior to session.    Patient agreeable to participate in evaluation.     Chief Complaint: Shortness of breath  Patient/Family Comments/goals: Pt reports that he wants to walk, but does not feel like he can walk long distances at this time.  Pain/Comfort:  Pain Rating 1: 0/10  Pain Rating Post-Intervention 1: 0/10    Patients cultural, spiritual, Jain conflicts given the current situation:      Social History  Living Environment: Patient lives alone in a mobile home, with 5 steps steps outside,.  Functional Level: Prior to admission patient was driving, ambulated without assistive device, was independent in ADL's, and was independent in IADL's.  Equipment at Home :none  Assistance Upon Discharge:  sister .    Objective:     Patient found sitting edge of bed with telemetry, peripheral IV (O2  not on upon entry to room)  upon PT entry to room.    General Precautions: Standard,     Orthopedic Precautions:    Braces:    Respiratory Status: 3 liters/min O2 via nasal cannula    Vitals   At Rest (pre-session)  BP  111/81   HR  85   O2 Sat %  97      With Activity (post-session)  BP  NT/NT   HR  110   O2 Sat %  98     Exams:  Orientation: Patient is oriented to Person, Place, Time, Situation  Commands: Patient follows commands consistently  Gross Motor Coordination:  WFL  BILAT UE ROM/strength - defer to OT - see OT note for details  RLE ROM: WFL  RLE Strength: WFL  LLE ROM: WFL  LLE Strength: WFL    Functional Mobility:    Bed Mobility:  Seated in chair at start of session and returned to chair    Transfers:  Sit to Stand: stand by assistance with no assistive device    Gait:  Patient ambulated 4ft with no assistive device and stand by assistance.  Patient demonstrates steady gait and no loss of balance.    Other Mobility:  not assessed    Balance:  Sit  Static: NORMAL: No deviations seen in posture held statically  Dynamic: GOOD+: Maintains balance through MAXIMAL excursions of active trunk motion  Stand  Static: FAIR+: Takes MINIMAL challenges from all directions  Dynamic: FAIR+: Needs CLOSE SUPERVISION during gait and is able to right self with minor LOB    Patient left sitting edge of bed with all lines intact and RN notified.    Education     Patient was instructed to utilize staff assistance for mobility/transfers.  White board updated regarding patient's safest level of mobility with staff assistance.    Goals     Multidisciplinary Problems       Physical Therapy Goals          Problem: Physical Therapy    Goal Priority Disciplines Outcome Goal Variances Interventions   Physical Therapy Goal     PT, PT/OT Ongoing, Progressing     Description: Goals to be met by: Discharge     Patient will increase functional independence with mobility by performin. Supine to sit with Modified [place]  2. Sit  to supine with Modified [place]  3. Sit to stand transfer with Modified [place]  4. Bed to chair transfer with Modified [place] using No Assistive Device  5. Gait  x 400 feet with [place] using No Assistive Device.                        History:     Past Medical History:   Diagnosis Date    Diabetes mellitus     Hypertension      History reviewed. No pertinent surgical history.  Time Tracking:     PT Received On: 06/09/23  PT Start Time: 1502     PT Stop Time: 1517  PT Total Time (min): 15 min     Billable Minutes: Evaluation Low 15 min     06/09/2023

## 2023-06-09 NOTE — PLAN OF CARE
Problem: Adult Inpatient Plan of Care  Goal: Plan of Care Review  Outcome: Ongoing, Progressing  Goal: Patient-Specific Goal (Individualized)  Outcome: Ongoing, Progressing  Goal: Absence of Hospital-Acquired Illness or Injury  Outcome: Ongoing, Progressing  Goal: Optimal Comfort and Wellbeing  Outcome: Ongoing, Progressing  Goal: Readiness for Transition of Care  Outcome: Ongoing, Progressing     Problem: Fall Injury Risk  Goal: Absence of Fall and Fall-Related Injury  Outcome: Ongoing, Progressing     Problem: Anxiety  Goal: Anxiety Reduction or Resolution  Outcome: Ongoing, Progressing     Problem: Fluid Imbalance (Pneumonia)  Goal: Fluid Balance  Outcome: Ongoing, Progressing     Problem: Infection (Pneumonia)  Goal: Resolution of Infection Signs and Symptoms  Outcome: Ongoing, Progressing     Problem: Respiratory Compromise (Pneumonia)  Goal: Effective Oxygenation and Ventilation  Outcome: Ongoing, Progressing     Problem: Diabetes Comorbidity  Goal: Blood Glucose Level Within Targeted Range  Outcome: Ongoing, Progressing

## 2023-06-10 LAB
ALBUMIN SERPL-MCNC: 4.1 G/DL (ref 3.4–4.8)
ALBUMIN/GLOB SERPL: 1.3 RATIO (ref 1.1–2)
ALP SERPL-CCNC: 92 UNIT/L (ref 40–150)
ALT SERPL-CCNC: 248 UNIT/L (ref 0–55)
AST SERPL-CCNC: 401 UNIT/L (ref 5–34)
BASOPHILS # BLD AUTO: 0.03 X10(3)/MCL
BASOPHILS NFR BLD AUTO: 0.2 %
BILIRUBIN DIRECT+TOT PNL SERPL-MCNC: 0.6 MG/DL
BUN SERPL-MCNC: 29.9 MG/DL (ref 8.4–25.7)
CALCIUM SERPL-MCNC: 9 MG/DL (ref 8.8–10)
CHLORIDE SERPL-SCNC: 95 MMOL/L (ref 98–107)
CO2 SERPL-SCNC: 22 MMOL/L (ref 23–31)
CREAT SERPL-MCNC: 1.5 MG/DL (ref 0.73–1.18)
EOSINOPHIL # BLD AUTO: 0.01 X10(3)/MCL (ref 0–0.9)
EOSINOPHIL NFR BLD AUTO: 0.1 %
ERYTHROCYTE [DISTWIDTH] IN BLOOD BY AUTOMATED COUNT: 11.9 % (ref 11.5–17)
GFR SERPLBLD CREATININE-BSD FMLA CKD-EPI: 53 MLS/MIN/1.73/M2
GLOBULIN SER-MCNC: 3.2 GM/DL (ref 2.4–3.5)
GLUCOSE SERPL-MCNC: 144 MG/DL (ref 82–115)
HCT VFR BLD AUTO: 45.7 % (ref 42–52)
HGB BLD-MCNC: 15.7 G/DL (ref 14–18)
IMM GRANULOCYTES # BLD AUTO: 0.07 X10(3)/MCL (ref 0–0.04)
IMM GRANULOCYTES NFR BLD AUTO: 0.5 %
LYMPHOCYTES # BLD AUTO: 3.36 X10(3)/MCL (ref 0.6–4.6)
LYMPHOCYTES NFR BLD AUTO: 23.3 %
MAGNESIUM SERPL-MCNC: 1.8 MG/DL (ref 1.6–2.6)
MCH RBC QN AUTO: 31.2 PG (ref 27–31)
MCHC RBC AUTO-ENTMCNC: 34.4 G/DL (ref 33–36)
MCV RBC AUTO: 90.7 FL (ref 80–94)
MONOCYTES # BLD AUTO: 1 X10(3)/MCL (ref 0.1–1.3)
MONOCYTES NFR BLD AUTO: 6.9 %
NEUTROPHILS # BLD AUTO: 9.92 X10(3)/MCL (ref 2.1–9.2)
NEUTROPHILS NFR BLD AUTO: 69 %
NRBC BLD AUTO-RTO: 0 %
PHOSPHATE SERPL-MCNC: 5.1 MG/DL (ref 2.3–4.7)
PLATELET # BLD AUTO: 193 X10(3)/MCL (ref 130–400)
PMV BLD AUTO: 11.1 FL (ref 7.4–10.4)
POCT GLUCOSE: 130 MG/DL (ref 70–110)
POCT GLUCOSE: 286 MG/DL (ref 70–110)
POTASSIUM SERPL-SCNC: 3.8 MMOL/L (ref 3.5–5.1)
PROT SERPL-MCNC: 7.3 GM/DL (ref 5.8–7.6)
RBC # BLD AUTO: 5.04 X10(6)/MCL (ref 4.7–6.1)
SODIUM SERPL-SCNC: 132 MMOL/L (ref 136–145)
WBC # SPEC AUTO: 14.39 X10(3)/MCL (ref 4.5–11.5)

## 2023-06-10 PROCEDURE — 25000003 PHARM REV CODE 250: Performed by: STUDENT IN AN ORGANIZED HEALTH CARE EDUCATION/TRAINING PROGRAM

## 2023-06-10 PROCEDURE — 25000242 PHARM REV CODE 250 ALT 637 W/ HCPCS

## 2023-06-10 PROCEDURE — 83735 ASSAY OF MAGNESIUM: CPT | Performed by: STUDENT IN AN ORGANIZED HEALTH CARE EDUCATION/TRAINING PROGRAM

## 2023-06-10 PROCEDURE — 25000003 PHARM REV CODE 250

## 2023-06-10 PROCEDURE — 27000221 HC OXYGEN, UP TO 24 HOURS

## 2023-06-10 PROCEDURE — 63600175 PHARM REV CODE 636 W HCPCS: Performed by: STUDENT IN AN ORGANIZED HEALTH CARE EDUCATION/TRAINING PROGRAM

## 2023-06-10 PROCEDURE — 21400001 HC TELEMETRY ROOM

## 2023-06-10 PROCEDURE — 94640 AIRWAY INHALATION TREATMENT: CPT

## 2023-06-10 PROCEDURE — 80053 COMPREHEN METABOLIC PANEL: CPT | Performed by: STUDENT IN AN ORGANIZED HEALTH CARE EDUCATION/TRAINING PROGRAM

## 2023-06-10 PROCEDURE — 85025 COMPLETE CBC W/AUTO DIFF WBC: CPT | Performed by: STUDENT IN AN ORGANIZED HEALTH CARE EDUCATION/TRAINING PROGRAM

## 2023-06-10 PROCEDURE — 94760 N-INVAS EAR/PLS OXIMETRY 1: CPT

## 2023-06-10 PROCEDURE — 84100 ASSAY OF PHOSPHORUS: CPT | Performed by: STUDENT IN AN ORGANIZED HEALTH CARE EDUCATION/TRAINING PROGRAM

## 2023-06-10 PROCEDURE — 63600175 PHARM REV CODE 636 W HCPCS: Performed by: EMERGENCY MEDICINE

## 2023-06-10 PROCEDURE — 94761 N-INVAS EAR/PLS OXIMETRY MLT: CPT

## 2023-06-10 RX ORDER — FUROSEMIDE 10 MG/ML
80 INJECTION INTRAMUSCULAR; INTRAVENOUS DAILY
Status: DISCONTINUED | OUTPATIENT
Start: 2023-06-10 | End: 2023-06-12 | Stop reason: HOSPADM

## 2023-06-10 RX ORDER — AMIODARONE HYDROCHLORIDE 200 MG/1
400 TABLET ORAL 2 TIMES DAILY
Status: DISCONTINUED | OUTPATIENT
Start: 2023-06-10 | End: 2023-06-12 | Stop reason: HOSPADM

## 2023-06-10 RX ORDER — FUROSEMIDE 10 MG/ML
80 INJECTION INTRAMUSCULAR; INTRAVENOUS ONCE
Status: COMPLETED | OUTPATIENT
Start: 2023-06-10 | End: 2023-06-10

## 2023-06-10 RX ORDER — IPRATROPIUM BROMIDE 0.5 MG/2.5ML
SOLUTION RESPIRATORY (INHALATION)
Status: DISPENSED
Start: 2023-06-10 | End: 2023-06-10

## 2023-06-10 RX ORDER — IPRATROPIUM BROMIDE 0.5 MG/2.5ML
0.5 SOLUTION RESPIRATORY (INHALATION) ONCE
Status: COMPLETED | OUTPATIENT
Start: 2023-06-10 | End: 2023-06-10

## 2023-06-10 RX ADMIN — LAMOTRIGINE 200 MG: 100 TABLET ORAL at 08:06

## 2023-06-10 RX ADMIN — ISOSORBIDE DINITRATE: 20 TABLET ORAL at 02:06

## 2023-06-10 RX ADMIN — APIXABAN 5 MG: 2.5 TABLET, FILM COATED ORAL at 08:06

## 2023-06-10 RX ADMIN — FUROSEMIDE 80 MG: 10 INJECTION, SOLUTION INTRAMUSCULAR; INTRAVENOUS at 01:06

## 2023-06-10 RX ADMIN — AMIODARONE HYDROCHLORIDE 400 MG: 200 TABLET ORAL at 10:06

## 2023-06-10 RX ADMIN — INSULIN ASPART 6 UNITS: 100 INJECTION, SOLUTION INTRAVENOUS; SUBCUTANEOUS at 03:06

## 2023-06-10 RX ADMIN — INSULIN DETEMIR 15 UNITS: 100 INJECTION, SOLUTION SUBCUTANEOUS at 08:06

## 2023-06-10 RX ADMIN — ASPIRIN 81 MG: 81 TABLET, COATED ORAL at 08:06

## 2023-06-10 RX ADMIN — FUROSEMIDE 80 MG: 10 INJECTION, SOLUTION INTRAMUSCULAR; INTRAVENOUS at 10:06

## 2023-06-10 RX ADMIN — ISOSORBIDE DINITRATE: 20 TABLET ORAL at 08:06

## 2023-06-10 RX ADMIN — ATORVASTATIN CALCIUM 20 MG: 20 TABLET, FILM COATED ORAL at 08:06

## 2023-06-10 RX ADMIN — LEVOFLOXACIN 750 MG: 750 INJECTION, SOLUTION INTRAVENOUS at 11:06

## 2023-06-10 RX ADMIN — AMIODARONE HYDROCHLORIDE 0.5 MG/MIN: 1.8 INJECTION, SOLUTION INTRAVENOUS at 05:06

## 2023-06-10 RX ADMIN — ISOSORBIDE DINITRATE: 20 TABLET ORAL at 01:06

## 2023-06-10 RX ADMIN — DULOXETINE 120 MG: 30 CAPSULE, DELAYED RELEASE ORAL at 08:06

## 2023-06-10 RX ADMIN — TRAZODONE HYDROCHLORIDE 300 MG: 100 TABLET ORAL at 08:06

## 2023-06-10 RX ADMIN — MIRTAZAPINE 30 MG: 30 TABLET, FILM COATED ORAL at 08:06

## 2023-06-10 RX ADMIN — AMIODARONE HYDROCHLORIDE 400 MG: 200 TABLET ORAL at 08:06

## 2023-06-10 RX ADMIN — IPRATROPIUM BROMIDE 0.5 MG: 0.5 SOLUTION RESPIRATORY (INHALATION) at 12:06

## 2023-06-10 RX ADMIN — PANTOPRAZOLE SODIUM 40 MG: 40 TABLET, DELAYED RELEASE ORAL at 08:06

## 2023-06-10 NOTE — PROGRESS NOTES
[facility] Internal Medicine History and Physical     Date of Admit: 6/7/2023  Current Hospital Day: 4     Chief Complaint:      Shortness of Breath (PT W CO SOB/COUGH AND INTERMITTENT CP X 3 DAYS.  RECENT EXPOSURE TO COVID AT WORK.  HR > 130 EKG OBTAINED. EKG A FIB W RVR . NO HX REPORTED. ) and Tachycardia      Subjective:     History of Present Illness:  Alphonso Wyatt is a 62 y.o. male with PMHx of HTN and DM who presented to [facility] ED with primary complaint of worsening shortness of breath, cough, and intermittent CP since Friday (6/8/23). Pt states symptoms began approximately x3 weeks ago with a sinus infection that migrated to his chest then resolved without any difficulties. 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 Friday, he began experiencing shortness of breath and non-productive cough without hemoptysis but with occasional pleuritic chest pain. Prior to event he denies experiencing any HAYNES. Currently reports shortness of breath at rest and with only taking a few steps.  Denies any lower extremity edema.  Reports sleeping in upright recliner for some time even though was able to lie flat without respiratory difficulties, though now is having orthopnea like symptoms.  Patient denies any previous history of DVT/PE.  Denies any recent travel or extended sedentary periods.     ED Course: Upon ED presentation HR significant for tachycardia (160), tachypnea (23), afebrile, and normotensive.  EKG revealed Afib w/ RVR in which Lopressor 5 mg was administered. Heart rate mildly (130 beats per minute) though patient became hypotensive (89/69) thus was initiated on amiodarone gtt.  He was also administered x2 L of LR.  D-dimer (1.14).  CT angio thorax performed, no pulmonary embolism identified, showed moderate B/L pleural effusions and ground-glass opacities (infectious versus inflammatory).  Additional lab significant for  "leukocytosis (12.3), hyperglycemia (302), BNP (242.9), TSH (5.6, with normal T4), and lactate (2.5). Internal Medicine consulted     Interval history:  Overnight, on call team was called to bedside due to concerns for tachypnea. On exam, had b/l crackles and JVD. Had suboptimal response to lasix 40 earlier in the day. He was initiated on bidil and given lasix IV 80 x1 with improvement in urine output.  Denies any current chest pain, palpitations, lightheadedness/dizziness, or vomiting. This morning, he reports that shortness of breath is greatly improved. States urinating has increased and he feels like fluid overload in abdomen has improved, no longer feels pressure like sensation. He continues on amiodarone gtt (plans to transition to oral today) & eliquis (for AFib) and broad-spectrum IV antibiotics (for CAP/HAP).     Review of Systems:  Review of Systems   Constitutional:  Negative for chills, diaphoresis, fever and weight loss.   Respiratory:  Positive for cough (non productive) and shortness of breath. Negative for hemoptysis, sputum production and wheezing.    Cardiovascular:  Positive for orthopnea. Negative for palpitations, claudication, leg swelling and PND.   Neurological:  Positive for weakness. Negative for dizziness, focal weakness, seizures and loss of consciousness.        Objective:   Vitals  Vitals  BP: 103/70  Temp: 97.2 °F (36.2 °C)  Temp Source: Tympanic  Pulse: 110  Resp: 20  SpO2: 96 %  Height: 5' 9" (175.3 cm)  Weight: 94.8 kg (209 lb)    Physical Examination:  Physical Exam  Constitutional:       General: He is not in acute distress.     Appearance: Normal appearance. He is not diaphoretic.   HENT:      Head: Normocephalic and atraumatic.      Mouth/Throat:      Mouth: Mucous membranes are moist.      Pharynx: Oropharynx is clear. No oropharyngeal exudate or posterior oropharyngeal erythema.   Eyes:      General: No scleral icterus.     Extraocular Movements: Extraocular movements intact. "      Pupils: Pupils are equal, round, and reactive to light.   Cardiovascular:      Rate and Rhythm: Tachycardia present. Rhythm irregular.      Pulses: Normal pulses.      Heart sounds: Normal heart sounds. No murmur heard.    No gallop.   Pulmonary:      Effort: No respiratory distress.      Breath sounds: No stridor. No wheezing or rhonchi. Rales: R>L.     Comments: Mild crackles at b/l bases  Abdominal:      General: There is distension.      Tenderness: There is no abdominal tenderness. There is no guarding or rebound.      Comments: No fluid wave present   Musculoskeletal:         General: Swelling (B/L lower extremities) present. No tenderness.      Right lower leg: Edema (1-2 +) present.      Left lower leg: Edema (1-2 +) present.   Skin:     General: Skin is warm and dry.      Capillary Refill: Capillary refill takes less than 2 seconds.      Coloration: Skin is not jaundiced or pale.      Findings: No bruising, erythema or lesion.   Neurological:      General: No focal deficit present.      Mental Status: He is alert and oriented to person, place, and time.   Psychiatric:         Mood and Affect: Mood normal.         Behavior: Behavior normal.         Thought Content: Thought content normal.         Judgment: Judgment normal.         Laboratory:  CMP:  Recent Labs   Lab 06/08/23  0241 06/09/23  0412 06/10/23  0502   * 126* 132*   K 4.7 4.5 3.8   CHLORIDE 100 98 95*   CO2 20* 17* 22*   BUN 15.9 26.8* 29.9*   CREATININE 1.50* 1.45* 1.50*   GLUCOSE 269* 360* 144*   CALCIUM 9.6 8.3* 9.0   ALBUMIN 3.8 3.4 4.1   BILITOT 0.4 0.4 0.6   AST 37* 229* 401*   ALT 29 108* 248*   ALKPHOS 81 75 92         CBC:  Recent Labs   Lab 06/08/23  0241 06/09/23  0412 06/10/23  0502   WBC 16.48* 14.04* 14.39*   ABSNEUTRO 11.25* 9.70* 9.92*   RBC 5.03 4.85 5.04   HGB 15.8 15.2 15.7   HCT 46.8 44.2 45.7    175 193   MCV 93.0 91.1 90.7   RDW 12.2 11.9 11.9         Trended Cardiac Data:   Recent Labs   Lab 06/07/23  1046    TROPONINI 0.040         Thyroid:   Recent Labs   Lab 06/07/23  1048   TSH 5.666*   [id number] 1.12         Urinalysis:   Recent Labs   Lab 06/07/23  1438   COLORUA Light-Yellow   APPEARANCEUA Clear   SGUA 1.035   PHUA 6.0   PROTEINUA 2+*   GLUCOSEUA Trace*   KETONESUA Negative   BLOODUA Negative   BILIRUBINUA Negative   UROBILINOGEN Normal   NITRITESUA Negative   LEUKOCYTESUR Negative   WBCUA 0-5   BACTERIA None Seen   SQEPUA Trace*   MUCOUSUA Trace*   HYALINECASTS 6-10*   RBCUA 0-5         REVIEWED      Radiology:  X-Ray Chest 1 View    Result Date: 6/7/2023  As above.  Recommend continued follow-up.  If further evaluation is obtained recommend two views the chest. Electronically signed by: Stuart Cornelius Date:    06/07/2023 Time:    11:23    CTA Chest Non-Coronary (PE Studies)    Result Date: 6/7/2023  1. Moderate bilateral pleural effusions with compressive atelectasis. 2. Few foci of ground-glass likely infectious or inflammatory. 3. No convincing pulmonary embolus. 4. Small volume ascites. Electronically signed by: Freddy Sinclair Date:    06/07/2023 Time:    13:52       Assessment & Plan:   1.) New Onset Atrial Fibrillation with RVR; likely secondary to infection (vs) HFrEF  - initial Suspicion for etiology 2/2 infection (vs) PE (vs) hypovolemia (vs) left atrial enlargement w/ suspected new onset CHF  - EKG showing afib: rate: 151  QTc: 491  No ST or T wave changes. Rightward Axis  - CXR as above  - CTA Thorax w/out PE evidence  - TTE (LVEF, 15%)  - TSH elevated (5.6), T4 wnl  - [id number] score: 2  - Anticoagulation: continue eliquis 5 mg BID  - Amiodarone gtt switched to oral today, will need 400 mg BID x 1 week->200 mg BID x 1 week->200 daily thereafter    2.) New Onset HFrEF (EF of 15%, 6/8/23)  3.) B/L Pleural Effusions  - New-onset Orthopnea & HAYNES in addition to B/L Pleural Effusions on CT Thorax  - No previous TTE on file  - Bedside TTE w/ IVC 1.8 cm & non-collapsible on inspiratory effort. 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)  - Likely 2/2 pneumonia though abnormal vitals likely also 2/2 to above A-fib w/ RVR  - CXR: as above  - CT Thorax: as above  - Initial Lactic acid (2.5), peaked at (4.8), then downtrended  - Received resuscitation fluids at 30 mL/kg within the first 3 hours  - Held additional IV crystalloids w/ IVC size as above & unknown LVEF  - transitioned from rocephin/azithromycin to levaquin (day 3 of antibiotics)  - Covid/Flu negative  - Blood cx x2 no growth at 48 hours, Resp Gram Stain/Culture unremarkable, MRSA PCR negative, UA unremarkable  - Incentive Spirometry    7.) Diabetes mellitus - poorly controlled  - Holding home metformin 1000mg BID  - Reports prior lantus 45u q.Nightly though has not used insulin in many years  - LDSSI in place  - with recent elevated CABGs; initiated Lantus 15 units b.i.d.; plan to up titrate as indicated  - Hb A1c: 10.4    8.) Hx of HTN  9.) Hx of HLD  - Holding home Ramipril 10mg q.D. at this time; plan to restart as BP permits  - C/w home Lipitor    10.) Hx of Bipolar Disorder/MDD  - C/w home Duloxetine, Mirtazapine, & Lamotrigine    11.) Suspicion for hepatomegaly/liver pathology  - No significant etoh hx  - Enlarged on bedside U/S & ascites appreciated on CT Thorax  - Pending Hepatitis Panel; pt reports multiple nail punctures/cuts from patients at work without recent labs completed  - Pending RUQ U/S    CODE STATUS: Full Code  Access: PIV  Antimicrobials:  Levaquin  Diet: Diet heart healthy Diabetic   DVT Prophylaxis:  Eliquis 5 mg b.i.d.  GI Prophylaxis: Protonix 40  Fluids:   None    Disposition: Patient admitted to telemetry in setting of new onset a-fib w/ RVR requiring Amiodarone gtt, new onset CHF, and suspected pneumonia with sepsis.  Has been transitioned to oral antibiotic and NOAC. Currently diuresing aggressively with plans of initiating GDMT once patient gets closer to euvolemia. Had good response to lasix 80 IV overnight, will repeat this dosage again today  Patient likely to require ischemic workup in outpatient setting as EF appears to be acutely reduced.  Appreciate cardiology's recommendations and assistance in care.      Yvonne Polo MD  Internal Medicine PGY-2

## 2023-06-10 NOTE — PLAN OF CARE
Problem: Adult Inpatient Plan of Care  Goal: Plan of Care Review  Outcome: Ongoing, Progressing  Goal: Patient-Specific Goal (Individualized)  Outcome: Ongoing, Progressing  Goal: Absence of Hospital-Acquired Illness or Injury  Outcome: Ongoing, Progressing  Goal: Optimal Comfort and Wellbeing  Outcome: Ongoing, Progressing  Goal: Readiness for Transition of Care  Outcome: Ongoing, Progressing     Problem: Fall Injury Risk  Goal: Absence of Fall and Fall-Related Injury  Outcome: Ongoing, Progressing     Problem: Anxiety  Goal: Anxiety Reduction or Resolution  Outcome: Ongoing, Progressing     Problem: Fluid Imbalance (Pneumonia)  Goal: Fluid Balance  Outcome: Ongoing, Progressing     Problem: Infection (Pneumonia)  Goal: Resolution of Infection Signs and Symptoms  Outcome: Ongoing, Progressing     Problem: Respiratory Compromise (Pneumonia)  Goal: Effective Oxygenation and Ventilation  Outcome: Ongoing, Progressing     Problem: Diabetes Comorbidity  Goal: Blood Glucose Level Within Targeted Range  Outcome: Ongoing, Progressing     Problem: Skin Injury Risk Increased  Goal: Skin Health and Integrity  Outcome: Ongoing, Progressing

## 2023-06-10 NOTE — CARE UPDATE
Called to evaluate patient with tachypnea. Examination reveals crackles bilaterally, and JVD elevation to angle of the mandible. Patient had suboptimal response to 40 IV and Albumin. Per chart review, patient not on ARNI. Will initiate bidil for afterload reduction, and give IV lasix 80 x1. Will monitor for 1 liter of output within 2 hours. Patient may require CPAP, but RT has concerns for contraindications with concomitant pleural effusions. Will try above, and defer initiation of GDMT to day team.       Antoine Sullivan MD  Internal Medicine Resident PGY-II

## 2023-06-10 NOTE — PROGRESS NOTES
[facility] Internal Medicine History and Physical     Date of Admit: 6/7/2023  Current Hospital Day: 3     Chief Complaint:      Shortness of Breath (PT W CO SOB/COUGH AND INTERMITTENT CP X 3 DAYS.  RECENT EXPOSURE TO COVID AT WORK.  HR > 130 EKG OBTAINED. EKG A FIB W RVR . NO HX REPORTED. ) and Tachycardia      Subjective:     History of Present Illness:  Alphonso Wyatt is a 62 y.o. male with PMHx of HTN and DM who presented to [facility] ED with primary complaint of worsening shortness of breath, cough, and intermittent CP since Friday (6/8/23). Pt states symptoms began approximately x3 weeks ago with a sinus infection that migrated to his chest then resolved without any difficulties. 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 Friday, he began experiencing shortness of breath and non-productive cough without hemoptysis but with occasional pleuritic chest pain. Prior to event he denies experiencing any HAYNES. Currently reports shortness of breath at rest and with only taking a few steps.  Denies any lower extremity edema.  Reports sleeping in upright recliner for some time even though was able to lie flat without respiratory difficulties, though now is having orthopnea like symptoms.  Patient denies any previous history of DVT/PE.  Denies any recent travel or extended sedentary periods.     ED Course: Upon ED presentation HR significant for tachycardia (160), tachypnea (23), afebrile, and normotensive.  EKG revealed Afib w/ RVR in which Lopressor 5 mg was administered. Heart rate mildly (130 beats per minute) though patient became hypotensive (89/69) thus was initiated on amiodarone gtt.  He was also administered x2 L of LR.  D-dimer (1.14).  CT angio thorax performed, no pulmonary embolism identified, showed moderate B/L pleural effusions and ground-glass opacities (infectious versus inflammatory).  Additional lab significant for  "leukocytosis (12.3), hyperglycemia (302), BNP (242.9), TSH (5.6, with normal T4), and lactate (2.5). Internal Medicine consulted     Interval history:  Patient seen evaluated by Cardiology yesterday (6/8) with recommendations in setting of new onset HFrEF & new onset atrial fibrillation as listed in plan below.  He received x1 dose IV Lasix 20 mg yesterday evening with a proximally 500 cc output.  Patient reports that overnight he had waxing/waning episodes of nonproductive cough, shortness of breath, nausea, feeling warm, diaphoresis, and difficulty sleeping.  Denies any current chest pain, palpitations, lightheadedness/dizziness, or vomiting.  Also denies any history of BPH or current dysuria/straining.  He continues on amiodarone gtt & heparin gtt (for AFib) and broad-spectrum IV antibiotics (for CAP/HAP).     Review of Systems:  Review of Systems   Constitutional:  Negative for chills, diaphoresis, fever and weight loss.   Respiratory:  Positive for cough (non productive) and shortness of breath. Negative for hemoptysis, sputum production and wheezing.    Cardiovascular:  Positive for orthopnea. Negative for palpitations, claudication, leg swelling and PND.   Neurological:  Positive for weakness. Negative for dizziness, focal weakness, seizures and loss of consciousness.        Objective:   Vitals  Vitals  BP: 121/86  Temp: 97.5 °F (36.4 °C)  Temp Source: Oral  Pulse: (!) 113  Resp: (!) 2  SpO2: 99 %  Height: 5' 9" (175.3 cm)  Weight: 94.8 kg (209 lb)    Physical Examination:  Physical Exam  Constitutional:       General: He is not in acute distress.     Appearance: Normal appearance. He is ill-appearing. He is not diaphoretic.   HENT:      Head: Normocephalic and atraumatic.      Mouth/Throat:      Mouth: Mucous membranes are moist.      Pharynx: Oropharynx is clear. No oropharyngeal exudate or posterior oropharyngeal erythema.   Eyes:      General: No scleral icterus.     Extraocular Movements: Extraocular " movements intact.      Pupils: Pupils are equal, round, and reactive to light.   Cardiovascular:      Rate and Rhythm: Tachycardia present. Rhythm irregular.      Pulses: Normal pulses.      Heart sounds: Normal heart sounds. No murmur heard.    No gallop.   Pulmonary:      Effort: No respiratory distress.      Breath sounds: No stridor. Rales (R>L) present. No wheezing or rhonchi.      Comments: Reduce bibasilar breath sounds (R> L)  Abdominal:      General: There is distension.      Tenderness: There is no abdominal tenderness. There is no guarding or rebound.      Comments: No fluid wave present   Musculoskeletal:         General: Swelling (B/L lower extremities) present. No tenderness.      Right lower leg: Edema (1-2 +) present.      Left lower leg: Edema (1-2 +) present.   Skin:     General: Skin is warm and dry.      Capillary Refill: Capillary refill takes less than 2 seconds.      Coloration: Skin is not jaundiced or pale.      Findings: No bruising, erythema or lesion.   Neurological:      General: No focal deficit present.      Mental Status: He is alert and oriented to person, place, and time.   Psychiatric:         Mood and Affect: Mood normal.         Behavior: Behavior normal.         Thought Content: Thought content normal.         Judgment: Judgment normal.         Laboratory:  CMP:  Recent Labs   Lab 06/07/23  1048 06/08/23  0241 06/09/23  0412    131* 126*   K 4.1 4.7 4.5   CHLORIDE 100 100 98   CO2 26 20* 17*   BUN 9.6 15.9 26.8*   CREATININE 1.08 1.50* 1.45*   GLUCOSE 302* 269* 360*   CALCIUM 10.1* 9.6 8.3*   ALBUMIN 4.2 3.8 3.4   BILITOT 0.6 0.4 0.4   AST 26 37* 229*   ALT 27 29 108*   ALKPHOS 80 81 75         CBC:  Recent Labs   Lab 06/07/23  1048 06/08/23  0241 06/09/23  0412   WBC 12.36* 16.48* 14.04*   ABSNEUTRO 8.16 11.25* 9.70*   RBC 5.14 5.03 4.85   HGB 16.1 15.8 15.2   HCT 48.4 46.8 44.2    217 175   MCV 94.2* 93.0 91.1   RDW 12.0 12.2 11.9         Trended Cardiac Data:    Recent Labs   Lab 06/07/23  1048   TROPONINI 0.040         Thyroid:   Recent Labs   Lab 06/07/23  1048   TSH 5.666*   [id number] 1.12         Urinalysis:   Recent Labs   Lab 06/07/23  1438   COLORUA Light-Yellow   APPEARANCEUA Clear   SGUA 1.035   PHUA 6.0   PROTEINUA 2+*   GLUCOSEUA Trace*   KETONESUA Negative   BLOODUA Negative   BILIRUBINUA Negative   UROBILINOGEN Normal   NITRITESUA Negative   LEUKOCYTESUR Negative   WBCUA 0-5   BACTERIA None Seen   SQEPUA Trace*   MUCOUSUA Trace*   HYALINECASTS 6-10*   RBCUA 0-5         REVIEWED      Radiology:  X-Ray Chest 1 View    Result Date: 6/7/2023  As above.  Recommend continued follow-up.  If further evaluation is obtained recommend two views the chest. Electronically signed by: Stuart Cornelius Date:    06/07/2023 Time:    11:23    CTA Chest Non-Coronary (PE Studies)    Result Date: 6/7/2023  1. Moderate bilateral pleural effusions with compressive atelectasis. 2. Few foci of ground-glass likely infectious or inflammatory. 3. No convincing pulmonary embolus. 4. Small volume ascites. Electronically signed by: Freddy Sinclair Date:    06/07/2023 Time:    13:52       Assessment & Plan:   1.) New Onset Atrial Fibrillation with RVR; likely secondary to infection (vs) HFrEF  - initial Suspicion for etiology 2/2 infection (vs) PE (vs) hypovolemia (vs) left atrial enlargement w/ suspected new onset CHF  - EKG showing afib: rate: 151  QTc: 491  No ST or T wave changes. Rightward Axis  - CXR as above  - CTA Thorax w/out PE evidence  - TTE (LVEF, 15%)  - TSH elevated (5.6), T4 wnl  - [id number] score: 2  - Anticoagulation: Heparin gtt; plan to transition to NOAC today (6/9) to avoid excessive IV volume administration with current improvement of nausea  - cardiology following; recommended continuing amiodarone drip at this time until patient is able to tolerate diuresis and reduction of bowel edema with plan to transition to p.o. amiodarone at that time to ensure adequate  absorption    2.) New Onset HFrEF (EF of 15%, 6/8/23)  3.) B/L Pleural Effusions  - New-onset Orthopnea & HAYNES in addition to B/L Pleural Effusions on CT Thorax  - No previous TTE on file  - Bedside TTE w/ IVC 1.8 cm & non-collapsible on inspiratory effort. Also possible global hypokinesia  - BNP on presentation (242.9)  - Troponin: WNL (0.040)  - patient received x1 dose IV Lasix 20 mg yesterday (6/8) with approximately 500 cc output  - with current stable BP; today administered x1 dose IV Lasix 40 mg this a.m. with only minimal UOP  - subsequently administered 25% 12.5 g albumin followed by IV Lasix 40 mg; closely monitoring urine output   - slight suspicion for urinary obstruction with recent poor urine output despite aggressive diuresis; will obtain bladder scan  - continue IV Lasix 40 mg b.i.d. 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 to avoid additional IV crystalloid volume in setting of diuresis  - Covid/Flu negative  - Pending: Blood cx x2, Resp Gram Stain/Culture, MRSA PCR, UA  - Incentive Spirometry    7.) Diabetes mellitus - poorly controlled  - Holding home metformin 1000mg BID  - Reports prior lantus 45u q.Nightly though has not used insulin in many years  - LDSSI in place  - with recent elevated CABGs; initiated Lantus 15 units b.i.d.; plan to up titrate as  indicated  - Hb A1c:     8.) Hx of HTN  9.) Hx of HLD  - Holding home Ramipril 10mg q.D. at this time; plan to restart as BP permits  - C/w home Lipitor    10.) Hx of Bipolar Disorder/MDD  - C/w home Duloxetine, Mirtazapine, & Lamotrigine    11.) Suspicion for hepatomegaly/liver pathology  - No significant etoh hx  - Enlarged on bedside U/S & ascites appreciated on CT Thorax  - Pending Hepatitis Panel; pt reports multiple nail punctures/cuts from patients at work without recent labs completed  - Pending RUQ U/S    CODE STATUS: Full Code  Access: PIV  Antimicrobials:  Levaquin  Diet: Diet heart healthy Diabetic   DVT Prophylaxis:  Eliquis 5 mg b.i.d.  GI Prophylaxis: Protonix 40  Fluids:  None    Disposition: Patient admitted to telemetry in setting of new onset a-fib w/ RVR requiring Amiodarone gtt, new onset CHF, and suspected pneumonia with sepsis.  Continues on amiodarone drip.  Heparin drip transition to NOAC. Currently diuresing aggressively with plans of initiating GDMT once patient gets closer to euvolemia.  Patient likely to require ischemic workup in outpatient setting as EF appears to be acutely reduced.  Appreciate cardiology's recommendations and assistance in care.      Jose Palacios MD  Internal Medicine PGY-2

## 2023-06-11 LAB
ALBUMIN SERPL-MCNC: 3.4 G/DL (ref 3.4–4.8)
ALBUMIN/GLOB SERPL: 1.2 RATIO (ref 1.1–2)
ALP SERPL-CCNC: 80 UNIT/L (ref 40–150)
ALT SERPL-CCNC: 153 UNIT/L (ref 0–55)
AST SERPL-CCNC: 119 UNIT/L (ref 5–34)
BASOPHILS # BLD AUTO: 0.03 X10(3)/MCL
BASOPHILS NFR BLD AUTO: 0.3 %
BILIRUBIN DIRECT+TOT PNL SERPL-MCNC: 0.9 MG/DL
BUN SERPL-MCNC: 19.6 MG/DL (ref 8.4–25.7)
CALCIUM SERPL-MCNC: 8.6 MG/DL (ref 8.8–10)
CHLORIDE SERPL-SCNC: 98 MMOL/L (ref 98–107)
CO2 SERPL-SCNC: 23 MMOL/L (ref 23–31)
CREAT SERPL-MCNC: 1.01 MG/DL (ref 0.73–1.18)
EOSINOPHIL # BLD AUTO: 0.05 X10(3)/MCL (ref 0–0.9)
EOSINOPHIL NFR BLD AUTO: 0.5 %
ERYTHROCYTE [DISTWIDTH] IN BLOOD BY AUTOMATED COUNT: 11.9 % (ref 11.5–17)
GFR SERPLBLD CREATININE-BSD FMLA CKD-EPI: >60 MLS/MIN/1.73/M2
GLOBULIN SER-MCNC: 2.8 GM/DL (ref 2.4–3.5)
GLUCOSE SERPL-MCNC: 143 MG/DL (ref 82–115)
HCT VFR BLD AUTO: 39 % (ref 42–52)
HGB BLD-MCNC: 13.7 G/DL (ref 14–18)
IMM GRANULOCYTES # BLD AUTO: 0.04 X10(3)/MCL (ref 0–0.04)
IMM GRANULOCYTES NFR BLD AUTO: 0.4 %
LYMPHOCYTES # BLD AUTO: 2.58 X10(3)/MCL (ref 0.6–4.6)
LYMPHOCYTES NFR BLD AUTO: 24.4 %
MAGNESIUM SERPL-MCNC: 1.8 MG/DL (ref 1.6–2.6)
MCH RBC QN AUTO: 31.4 PG (ref 27–31)
MCHC RBC AUTO-ENTMCNC: 35.1 G/DL (ref 33–36)
MCV RBC AUTO: 89.4 FL (ref 80–94)
MONOCYTES # BLD AUTO: 1.12 X10(3)/MCL (ref 0.1–1.3)
MONOCYTES NFR BLD AUTO: 10.6 %
NEUTROPHILS # BLD AUTO: 6.74 X10(3)/MCL (ref 2.1–9.2)
NEUTROPHILS NFR BLD AUTO: 63.8 %
NRBC BLD AUTO-RTO: 0 %
PLATELET # BLD AUTO: 161 X10(3)/MCL (ref 130–400)
PMV BLD AUTO: 10.7 FL (ref 7.4–10.4)
POCT GLUCOSE: 105 MG/DL (ref 70–110)
POCT GLUCOSE: 107 MG/DL (ref 70–110)
POCT GLUCOSE: 158 MG/DL (ref 70–110)
POCT GLUCOSE: 226 MG/DL (ref 70–110)
POCT GLUCOSE: 261 MG/DL (ref 70–110)
POTASSIUM SERPL-SCNC: 3.3 MMOL/L (ref 3.5–5.1)
PROT SERPL-MCNC: 6.2 GM/DL (ref 5.8–7.6)
RBC # BLD AUTO: 4.36 X10(6)/MCL (ref 4.7–6.1)
SODIUM SERPL-SCNC: 133 MMOL/L (ref 136–145)
WBC # SPEC AUTO: 10.56 X10(3)/MCL (ref 4.5–11.5)

## 2023-06-11 PROCEDURE — 25000003 PHARM REV CODE 250

## 2023-06-11 PROCEDURE — 21400001 HC TELEMETRY ROOM

## 2023-06-11 PROCEDURE — 25000003 PHARM REV CODE 250: Performed by: STUDENT IN AN ORGANIZED HEALTH CARE EDUCATION/TRAINING PROGRAM

## 2023-06-11 PROCEDURE — 63600175 PHARM REV CODE 636 W HCPCS: Performed by: STUDENT IN AN ORGANIZED HEALTH CARE EDUCATION/TRAINING PROGRAM

## 2023-06-11 PROCEDURE — 80053 COMPREHEN METABOLIC PANEL: CPT | Performed by: STUDENT IN AN ORGANIZED HEALTH CARE EDUCATION/TRAINING PROGRAM

## 2023-06-11 PROCEDURE — 94761 N-INVAS EAR/PLS OXIMETRY MLT: CPT

## 2023-06-11 PROCEDURE — 85025 COMPLETE CBC W/AUTO DIFF WBC: CPT | Performed by: STUDENT IN AN ORGANIZED HEALTH CARE EDUCATION/TRAINING PROGRAM

## 2023-06-11 PROCEDURE — 83735 ASSAY OF MAGNESIUM: CPT | Performed by: STUDENT IN AN ORGANIZED HEALTH CARE EDUCATION/TRAINING PROGRAM

## 2023-06-11 PROCEDURE — 94760 N-INVAS EAR/PLS OXIMETRY 1: CPT

## 2023-06-11 RX ORDER — POTASSIUM CHLORIDE 20 MEQ/1
40 TABLET, EXTENDED RELEASE ORAL ONCE
Status: COMPLETED | OUTPATIENT
Start: 2023-06-11 | End: 2023-06-11

## 2023-06-11 RX ORDER — SPIRONOLACTONE 25 MG/1
25 TABLET ORAL DAILY
Status: DISCONTINUED | OUTPATIENT
Start: 2023-06-11 | End: 2023-06-12 | Stop reason: HOSPADM

## 2023-06-11 RX ADMIN — INSULIN ASPART 2 UNITS: 100 INJECTION, SOLUTION INTRAVENOUS; SUBCUTANEOUS at 08:06

## 2023-06-11 RX ADMIN — ATORVASTATIN CALCIUM 20 MG: 20 TABLET, FILM COATED ORAL at 08:06

## 2023-06-11 RX ADMIN — PANTOPRAZOLE SODIUM 40 MG: 40 TABLET, DELAYED RELEASE ORAL at 08:06

## 2023-06-11 RX ADMIN — INSULIN DETEMIR 15 UNITS: 100 INJECTION, SOLUTION SUBCUTANEOUS at 08:06

## 2023-06-11 RX ADMIN — POTASSIUM CHLORIDE 40 MEQ: 1500 TABLET, EXTENDED RELEASE ORAL at 08:06

## 2023-06-11 RX ADMIN — FUROSEMIDE 80 MG: 10 INJECTION, SOLUTION INTRAMUSCULAR; INTRAVENOUS at 08:06

## 2023-06-11 RX ADMIN — AMIODARONE HYDROCHLORIDE 400 MG: 200 TABLET ORAL at 08:06

## 2023-06-11 RX ADMIN — TRAZODONE HYDROCHLORIDE 300 MG: 100 TABLET ORAL at 08:06

## 2023-06-11 RX ADMIN — APIXABAN 5 MG: 2.5 TABLET, FILM COATED ORAL at 08:06

## 2023-06-11 RX ADMIN — LEVOFLOXACIN 750 MG: 750 INJECTION, SOLUTION INTRAVENOUS at 11:06

## 2023-06-11 RX ADMIN — SACUBITRIL AND VALSARTAN 1 TABLET: 24; 26 TABLET, FILM COATED ORAL at 08:06

## 2023-06-11 RX ADMIN — MIRTAZAPINE 30 MG: 30 TABLET, FILM COATED ORAL at 08:06

## 2023-06-11 RX ADMIN — SPIRONOLACTONE 25 MG: 25 TABLET, FILM COATED ORAL at 08:06

## 2023-06-11 RX ADMIN — ASPIRIN 81 MG: 81 TABLET, COATED ORAL at 08:06

## 2023-06-11 RX ADMIN — LAMOTRIGINE 200 MG: 100 TABLET ORAL at 08:06

## 2023-06-11 RX ADMIN — INSULIN ASPART 4 UNITS: 100 INJECTION, SOLUTION INTRAVENOUS; SUBCUTANEOUS at 12:06

## 2023-06-11 RX ADMIN — DULOXETINE 120 MG: 30 CAPSULE, DELAYED RELEASE ORAL at 08:06

## 2023-06-11 NOTE — PROGRESS NOTES
U Internal Medicine History and Physical     Date of Admit: 6/7/2023  Current Hospital Day: 5     Chief Complaint:      Shortness of Breath (PT W CO SOB/COUGH AND INTERMITTENT CP X 3 DAYS.  RECENT EXPOSURE TO COVID AT WORK.  HR > 130 EKG OBTAINED. EKG A FIB W RVR . NO HX REPORTED. ) and Tachycardia      Subjective:     History of Present Illness:  Alphonso Wyatt is a 62 y.o. male with PMHx of HTN and DM who presented to [facility] ED with primary complaint of worsening shortness of breath, cough, and intermittent CP since Friday (6/8/23). Pt states symptoms began approximately x3 weeks ago with a sinus infection that migrated to his chest then resolved without any difficulties. 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 Friday, he began experiencing shortness of breath and non-productive cough without hemoptysis but with occasional pleuritic chest pain. Prior to event he denies experiencing any HAYNES. Currently reports shortness of breath at rest and with only taking a few steps.  Denies any lower extremity edema.  Reports sleeping in upright recliner for some time even though was able to lie flat without respiratory difficulties, though now is having orthopnea like symptoms.  Patient denies any previous history of DVT/PE.  Denies any recent travel or extended sedentary periods.     ED Course: Upon ED presentation HR significant for tachycardia (160), tachypnea (23), afebrile, and normotensive.  EKG revealed Afib w/ RVR in which Lopressor 5 mg was administered. Heart rate mildly (130 beats per minute) though patient became hypotensive (89/69) thus was initiated on amiodarone gtt.  He was also administered x2 L of LR.  D-dimer (1.14).  CT angio thorax performed, no pulmonary embolism identified, showed moderate B/L pleural effusions and ground-glass opacities (infectious versus inflammatory).  Additional lab significant for  "leukocytosis (12.3), hyperglycemia (302), BNP (242.9), TSH (5.6, with normal T4), and lactate (2.5). Internal Medicine consulted     Interval history:  NAEON. Yesterday (6/10, patient was continued on Lasix 80mg IV q.D. 2/2 previous appropriate UOP the day prior. UOP over past x24 hours (- 5600 cc) w/ a net (-) 7600 cc since admission. This AM, patient happy to report ability to rest last night & overall feeling better with decreased shortness of breath. Yesterday, Amiodarone transition from gtt to PO, tolerating well, on protocol as below. Also transitioned from IV to PO abx for suspected PNA. Continues on Eliquis. Discontinuing BiDil today & initiating GDMT w/ Entresto & Aldactone.    Review of Systems:  Review of Systems   Constitutional:  Negative for chills, diaphoresis, fever and weight loss.   Respiratory:  Positive for cough (non productive, improved in interval) and shortness of breath (improved in interval). Negative for hemoptysis, sputum production and wheezing.    Cardiovascular:  Positive for orthopnea. Negative for palpitations, claudication, leg swelling and PND.   Neurological:  Negative for dizziness, focal weakness, seizures, loss of consciousness and weakness.        Objective:   Vitals  Vitals  BP: 103/72  Temp: 98.1 °F (36.7 °C)  Temp Source: Oral  Pulse: (!) 125  Resp: 20  SpO2: 96 %  Height: 5' 9" (175.3 cm)  Weight: 95.7 kg (211 lb)    Physical Examination:  Physical Exam  Constitutional:       General: He is not in acute distress.     Appearance: Normal appearance. He is not diaphoretic.   HENT:      Head: Normocephalic and atraumatic.      Mouth/Throat:      Mouth: Mucous membranes are moist.      Pharynx: Oropharynx is clear. No oropharyngeal exudate or posterior oropharyngeal erythema.   Eyes:      General: No scleral icterus.     Extraocular Movements: Extraocular movements intact.      Pupils: Pupils are equal, round, and reactive to light.   Cardiovascular:      Rate and Rhythm: " Tachycardia present. Rhythm irregular.      Pulses: Normal pulses.      Heart sounds: Normal heart sounds. No murmur heard.    No gallop.   Pulmonary:      Effort: No respiratory distress.      Breath sounds: No stridor. Rales (Bibasilar, improved) present. No wheezing or rhonchi.      Comments: Mild crackles at b/l bases  Abdominal:      General: There is distension.      Tenderness: There is no abdominal tenderness. There is no guarding or rebound.      Comments: No fluid wave present   Musculoskeletal:         General: Swelling (B/L lower extremities) present. No tenderness.      Right lower leg: Edema (1+) present.      Left lower leg: Edema (1+) present.   Skin:     General: Skin is warm and dry.      Capillary Refill: Capillary refill takes less than 2 seconds.      Coloration: Skin is not jaundiced or pale.      Findings: No bruising, erythema or lesion.   Neurological:      General: No focal deficit present.      Mental Status: He is alert and oriented to person, place, and time.   Psychiatric:         Mood and Affect: Mood normal.         Behavior: Behavior normal.         Thought Content: Thought content normal.         Judgment: Judgment normal.         Laboratory:  CMP:  Recent Labs   Lab 06/09/23  0412 06/10/23  0502 06/11/23  0336   * 132* 133*   K 4.5 3.8 3.3*   CHLORIDE 98 95* 98   CO2 17* 22* 23   BUN 26.8* 29.9* 19.6   CREATININE 1.45* 1.50* 1.01   GLUCOSE 360* 144* 143*   CALCIUM 8.3* 9.0 8.6*   ALBUMIN 3.4 4.1 3.4   BILITOT 0.4 0.6 0.9   * 401* 119*   * 248* 153*   ALKPHOS 75 92 80         CBC:  Recent Labs   Lab 06/09/23  0412 06/10/23  0502 06/11/23  0336   WBC 14.04* 14.39* 10.56   ABSNEUTRO 9.70* 9.92* 6.74   RBC 4.85 5.04 4.36*   HGB 15.2 15.7 13.7*   HCT 44.2 45.7 39.0*    193 161   MCV 91.1 90.7 89.4   RDW 11.9 11.9 11.9         Trended Cardiac Data:   Recent Labs   Lab 06/07/23  1048   TROPONINI 0.040         Thyroid:   Recent Labs   Lab 06/07/23  1048   TSH  5.666*   [id number] 1.12         Urinalysis:   Recent Labs   Lab 06/07/23  1438   COLORUA Light-Yellow   APPEARANCEUA Clear   SGUA 1.035   PHUA 6.0   PROTEINUA 2+*   GLUCOSEUA Trace*   KETONESUA Negative   BLOODUA Negative   BILIRUBINUA Negative   UROBILINOGEN Normal   NITRITESUA Negative   LEUKOCYTESUR Negative   WBCUA 0-5   BACTERIA None Seen   SQEPUA Trace*   MUCOUSUA Trace*   HYALINECASTS 6-10*   RBCUA 0-5         REVIEWED      Radiology:  X-Ray Chest 1 View    Result Date: 6/7/2023  As above.  Recommend continued follow-up.  If further evaluation is obtained recommend two views the chest. Electronically signed by: Stuart Cornelius Date:    06/07/2023 Time:    11:23    CTA Chest Non-Coronary (PE Studies)    Result Date: 6/7/2023  1. Moderate bilateral pleural effusions with compressive atelectasis. 2. Few foci of ground-glass likely infectious or inflammatory. 3. No convincing pulmonary embolus. 4. Small volume ascites. Electronically signed by: Freddy Sinclair Date:    06/07/2023 Time:    13:52       Assessment & Plan:   1.) New Onset Atrial Fibrillation with RVR; likely secondary to infection (vs) HFrEF  - initial Suspicion for etiology 2/2 infection (vs) PE (vs) hypovolemia (vs) left atrial enlargement w/ suspected new onset CHF  - EKG showing afib: rate: 151  QTc: 491  No ST or T wave changes. Rightward Axis  - CXR as above  - CTA Thorax w/out PE evidence  - TTE (LVEF, 15%)  - TSH elevated (5.6), T4 wnl  - YTC2TP2-QVAl score: 2  - Anticoagulation: continue eliquis 5 mg BID  - Continue PO Amiodarone; will need 400 mg BID x 1 week->200 mg BID x 1 week->200 daily thereafter    2.) New Onset HFrEF (EF of 15%, 6/8/23)  3.) B/L Pleural Effusions  - New-onset Orthopnea & HAYNES in addition to B/L Pleural Effusions on CT Thorax  - No previous TTE on file  - Bedside TTE w/ IVC 1.8 cm & non-collapsible on inspiratory effort. Also possible global hypokinesia  - BNP on presentation (242.9)  - Troponin: WNL (0.040)  -  Continue IV Lasix 80mg q.D. at this time  - Closely monitoring UOP & renal function as patient nears euvolemia to determine dry weightr  - Discontinued BiDil today (6/10) & initiated Entresto and Aldactone.   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)  - Likely 2/2 pneumonia though abnormal vitals likely also 2/2 to above A-fib w/ RVR  - CXR: as above  - CT Thorax: as above  - Initial Lactic acid (2.5), peaked at (4.8), then downtrended  - Received resuscitation fluids at 30 mL/kg within the first 3 hours  - Held additional IV crystalloids w/ IVC size as above & unknown LVEF  - transitioned from IV rocephin/azithromycin to PO levaquin on 6/10 (day 4 of antibiotics)  - Covid/Flu negative  - Blood cx x2 no growth at 72 hours, Resp Gram Stain/Culture unremarkable, MRSA PCR negative, UA unremarkable  - Incentive Spirometry    7.) Diabetes mellitus - poorly controlled  - Holding home metformin 1000mg BID  - Reports prior lantus 45u q.Nightly though has not used insulin in many years  - LDSSI in place  - with recent elevated CABGs; initiated Lantus 15 units b.i.d.; plan to up titrate as indicated  - Hb A1c: 10.4    8.) Hx of HTN  9.) Hx of HLD  - Will need to discontinue home ramipril medication upon discharge as patient initiated on Entresto  - C/w home Atorvastatin    10.) Hx of Bipolar Disorder/MDD  - C/w home Duloxetine, Mirtazapine, & Lamotrigine    11.) Suspicion for hepatomegaly/liver pathology  - No significant etoh hx  - Enlarged on bedside U/S & ascites appreciated on CT Thorax  - Pending Hepatitis Panel; pt reports multiple nail punctures/cuts from patients at work without recent labs completed  - Pending  RUQ U/S    CODE STATUS: Full Code  Access: PIV  Antimicrobials:  Levaquin  Diet: Diet heart healthy Diabetic   DVT Prophylaxis:  Eliquis 5 mg b.i.d.  GI Prophylaxis: Protonix 40  Fluids:  None    Disposition: Patient admitted to telemetry in setting of new onset a-fib w/ RVR requiring Amiodarone gtt, new onset CHF, and suspected pneumonia with sepsis.  Has been transitioned to oral antibiotic and NOAC. Currently diuresing aggressively. Initiated Entresto & Aldactone w/ discontinuation of Bidil today. Patient likely to require ischemic workup in outpatient setting as EF appears to be acutely reduced.  Appreciate cardiology's recommendations and assistance in care.      Jose Palacios MD  Internal Medicine PGY-2

## 2023-06-12 VITALS
RESPIRATION RATE: 21 BRPM | WEIGHT: 211.19 LBS | HEART RATE: 115 BPM | HEIGHT: 69 IN | SYSTOLIC BLOOD PRESSURE: 107 MMHG | DIASTOLIC BLOOD PRESSURE: 72 MMHG | BODY MASS INDEX: 31.28 KG/M2 | OXYGEN SATURATION: 98 % | TEMPERATURE: 98 F

## 2023-06-12 PROBLEM — I50.9 CHF (CONGESTIVE HEART FAILURE): Status: ACTIVE | Noted: 2023-06-12

## 2023-06-12 LAB
ALBUMIN SERPL-MCNC: 3.4 G/DL (ref 3.4–4.8)
ALBUMIN/GLOB SERPL: 1.1 RATIO (ref 1.1–2)
ALP SERPL-CCNC: 76 UNIT/L (ref 40–150)
ALT SERPL-CCNC: 123 UNIT/L (ref 0–55)
AST SERPL-CCNC: 85 UNIT/L (ref 5–34)
BACTERIA BLD CULT: NORMAL
BACTERIA BLD CULT: NORMAL
BASOPHILS # BLD AUTO: 0.04 X10(3)/MCL
BASOPHILS NFR BLD AUTO: 0.4 %
BILIRUBIN DIRECT+TOT PNL SERPL-MCNC: 1 MG/DL
BUN SERPL-MCNC: 15.7 MG/DL (ref 8.4–25.7)
CALCIUM SERPL-MCNC: 8.6 MG/DL (ref 8.8–10)
CHLORIDE SERPL-SCNC: 98 MMOL/L (ref 98–107)
CO2 SERPL-SCNC: 24 MMOL/L (ref 23–31)
CREAT SERPL-MCNC: 0.98 MG/DL (ref 0.73–1.18)
EOSINOPHIL # BLD AUTO: 0.08 X10(3)/MCL (ref 0–0.9)
EOSINOPHIL NFR BLD AUTO: 0.7 %
ERYTHROCYTE [DISTWIDTH] IN BLOOD BY AUTOMATED COUNT: 11.9 % (ref 11.5–17)
GFR SERPLBLD CREATININE-BSD FMLA CKD-EPI: >60 MLS/MIN/1.73/M2
GLOBULIN SER-MCNC: 3.1 GM/DL (ref 2.4–3.5)
GLUCOSE SERPL-MCNC: 131 MG/DL (ref 82–115)
HCT VFR BLD AUTO: 42.4 % (ref 42–52)
HGB BLD-MCNC: 14.6 G/DL (ref 14–18)
IMM GRANULOCYTES # BLD AUTO: 0.04 X10(3)/MCL (ref 0–0.04)
IMM GRANULOCYTES NFR BLD AUTO: 0.4 %
LYMPHOCYTES # BLD AUTO: 3.58 X10(3)/MCL (ref 0.6–4.6)
LYMPHOCYTES NFR BLD AUTO: 32 %
MCH RBC QN AUTO: 31.9 PG (ref 27–31)
MCHC RBC AUTO-ENTMCNC: 34.4 G/DL (ref 33–36)
MCV RBC AUTO: 92.6 FL (ref 80–94)
MONOCYTES # BLD AUTO: 1.11 X10(3)/MCL (ref 0.1–1.3)
MONOCYTES NFR BLD AUTO: 9.9 %
NEUTROPHILS # BLD AUTO: 6.35 X10(3)/MCL (ref 2.1–9.2)
NEUTROPHILS NFR BLD AUTO: 56.6 %
NRBC BLD AUTO-RTO: 0 %
PLATELET # BLD AUTO: 169 X10(3)/MCL (ref 130–400)
PMV BLD AUTO: 10.6 FL (ref 7.4–10.4)
POCT GLUCOSE: 139 MG/DL (ref 70–110)
POTASSIUM SERPL-SCNC: 3.6 MMOL/L (ref 3.5–5.1)
PROT SERPL-MCNC: 6.5 GM/DL (ref 5.8–7.6)
RBC # BLD AUTO: 4.58 X10(6)/MCL (ref 4.7–6.1)
SODIUM SERPL-SCNC: 133 MMOL/L (ref 136–145)
WBC # SPEC AUTO: 11.2 X10(3)/MCL (ref 4.5–11.5)

## 2023-06-12 PROCEDURE — 85025 COMPLETE CBC W/AUTO DIFF WBC: CPT | Performed by: STUDENT IN AN ORGANIZED HEALTH CARE EDUCATION/TRAINING PROGRAM

## 2023-06-12 PROCEDURE — 80053 COMPREHEN METABOLIC PANEL: CPT | Performed by: STUDENT IN AN ORGANIZED HEALTH CARE EDUCATION/TRAINING PROGRAM

## 2023-06-12 PROCEDURE — 25000003 PHARM REV CODE 250: Performed by: STUDENT IN AN ORGANIZED HEALTH CARE EDUCATION/TRAINING PROGRAM

## 2023-06-12 PROCEDURE — 63600175 PHARM REV CODE 636 W HCPCS: Performed by: STUDENT IN AN ORGANIZED HEALTH CARE EDUCATION/TRAINING PROGRAM

## 2023-06-12 PROCEDURE — 25000003 PHARM REV CODE 250

## 2023-06-12 PROCEDURE — 94761 N-INVAS EAR/PLS OXIMETRY MLT: CPT

## 2023-06-12 RX ORDER — AMIODARONE HYDROCHLORIDE 400 MG/1
TABLET ORAL
Qty: 60 TABLET | Refills: 0 | Status: SHIPPED | OUTPATIENT
Start: 2023-06-12 | End: 2023-08-07 | Stop reason: SDUPTHER

## 2023-06-12 RX ORDER — FUROSEMIDE 40 MG/1
40 TABLET ORAL DAILY
Qty: 90 TABLET | Refills: 3 | Status: SHIPPED | OUTPATIENT
Start: 2023-06-12 | End: 2024-06-11

## 2023-06-12 RX ORDER — SPIRONOLACTONE 25 MG/1
25 TABLET ORAL DAILY
Qty: 90 TABLET | Refills: 3 | Status: SHIPPED | OUTPATIENT
Start: 2023-06-13 | End: 2024-06-12

## 2023-06-12 RX ADMIN — ASPIRIN 81 MG: 81 TABLET, COATED ORAL at 08:06

## 2023-06-12 RX ADMIN — DULOXETINE 120 MG: 30 CAPSULE, DELAYED RELEASE ORAL at 08:06

## 2023-06-12 RX ADMIN — LAMOTRIGINE 200 MG: 100 TABLET ORAL at 08:06

## 2023-06-12 RX ADMIN — SACUBITRIL AND VALSARTAN 1 TABLET: 24; 26 TABLET, FILM COATED ORAL at 08:06

## 2023-06-12 RX ADMIN — AMIODARONE HYDROCHLORIDE 400 MG: 200 TABLET ORAL at 08:06

## 2023-06-12 RX ADMIN — APIXABAN 5 MG: 2.5 TABLET, FILM COATED ORAL at 08:06

## 2023-06-12 RX ADMIN — SPIRONOLACTONE 25 MG: 25 TABLET, FILM COATED ORAL at 08:06

## 2023-06-12 RX ADMIN — INSULIN DETEMIR 15 UNITS: 100 INJECTION, SOLUTION SUBCUTANEOUS at 08:06

## 2023-06-12 RX ADMIN — PANTOPRAZOLE SODIUM 40 MG: 40 TABLET, DELAYED RELEASE ORAL at 08:06

## 2023-06-12 RX ADMIN — FUROSEMIDE 80 MG: 10 INJECTION, SOLUTION INTRAMUSCULAR; INTRAVENOUS at 08:06

## 2023-06-12 NOTE — PT/OT/SLP DISCHARGE
POST DISCHARGE DOCUMENTATION - 2023 2:44 PM    Physical Therapy Discharge Summary    Name: Alphonso Wyatt  MRN: [id number]   Principal Problem: CHF (congestive heart failure)       Recommendations - per last treatment session     Discharge Recommendations:  home   Discharge Equipment Recommendations: none     Assessment:     Refer to prior Physical Therapy note dated 23 for last known functional status of patient.    Patient was unexpectedly discharged from hospital.  Refer to therapy's initial evaluation or last treatment note for patient's most recent functional status and goal achievement and therapists' recommendations.    Objective     GOALS:  Multidisciplinary Problems       Physical Therapy Goals          Problem: Physical Therapy    Goal Priority Disciplines Outcome Goal Variances Interventions   Physical Therapy Goal     PT, PT/OT Ongoing, Progressing     Description: Goals to be met by: Discharge     Patient will increase functional independence with mobility by performin. Supine to sit with Modified [place]  2. Sit to supine with Modified [place]  3. Sit to stand transfer with Modified [place]  4. Bed to chair transfer with Modified [place] using No Assistive Device  5. Gait  x 400 feet with [place] using No Assistive Device.                          Plan     Patient Discharged to: home or self care per chart.    2023

## 2023-06-12 NOTE — DISCHARGE SUMMARY
[facility] Internal Medicine Discharge Summary    Admitting Physician: Cele Mares MD  Attending Physician: No att. providers found  Date of Admit: 6/7/2023  Date of Discharge: 6/12/2023    Condition: Good  Outcome: Condition has improved and patient is now ready for discharge.  DISPOSITION: Home or Self Care    Discharge Diagnoses     Patient Active Problem List   Diagnosis    CHF (congestive heart failure)       Principal Problem:  CHF (congestive heart failure)    Consultants and Procedures     Consultants:  Consults (From admission, onward)          Status Ordering Provider     Inpatient consult to Cardiology  Once        Provider:  Shahram Lau MD    Completed MARCELA GONZALEZ             Procedures:   * No surgery found *     Brief Admission History      Alphonso Wyatt is a 62 y.o. male with PMHx of HTN and DM who presented to [facility] ED with primary complaint of worsening shortness of breath, cough, and intermittent CP since Friday (6/8/23). Pt states symptoms began approximately x3 weeks ago with a sinus infection that migrated to his chest then resolved without any difficulties. 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 Friday, he began experiencing shortness of breath and non-productive cough without hemoptysis but with occasional pleuritic chest pain. Prior to event he denies experiencing any HAYNES. Currently reports shortness of breath at rest and with only taking a few steps.  Denies any lower extremity edema.  Reports sleeping in upright recliner for some time even though was able to lie flat without respiratory difficulties, though now is having orthopnea like symptoms.  Patient denies any previous history of DVT/PE.  Denies any recent travel or extended sedentary periods.      ED Course: Upon ED presentation HR significant for tachycardia (160), tachypnea (23), afebrile, and normotensive.  EKG revealed Afib w/  RVR in which Lopressor 5 mg was administered. Heart rate mildly (130 beats per minute) though patient became hypotensive (89/69) thus was initiated on amiodarone gtt.  He was also administered x2 L of LR.  D-dimer (1.14).  CT angio thorax performed, no pulmonary embolism identified, showed moderate B/L pleural effusions and ground-glass opacities (infectious versus inflammatory).  Additional lab significant for leukocytosis (12.3), hyperglycemia (302), BNP (242.9), TSH (5.6, with normal T4), and lactate (2.5). Internal Medicine consulted     Hospital Course with Pertinent Findings   Patient admitted for management of new onset atrial fibrillation with RVR, sepsis likely 2/2 CAP and new onset CHF. He was continued on amiodarone gtt and anticoagulated with heparin gtt for A fib RVR. Initiated on rocephin and azithromycin for CAP. Cardiology was consulted and recommended transitionint to PO amiodarone and eliquis once able to tolerate PO. For new onset systolic heart failure with EF 15%, Cardiology recommended diuresis with lasix, adding GDMT as BP and renal function allowed and evaluation as outpatient for coronary angiogram. On hospital day 3, concern for tachypnea and continued fluid overload overnight, thus lasix was increased to 80 IV daily with good urine output. He was also able to transition to PO amiodarone at this time and eliquis was initiated.On hospital day 4, entresto 24-26 and aldactone were initiated without issue. Blood pressure and renal indices remained stable. On hospital day 5,given stable VS, renal indices WNL, output -8.31 throughout hospital admission, and completion of CAP treatment, patient was deemed stable for discharge. Referrals sent to [facility] for postwards and establisment with PCP. Referral sent to Cardiology clinic for evaluation for outpatient [facility]. ED precautions given and medications delivered to bedside     Discharge physical exam:  Vitals  BP: 107/72  Temp: 98.2 °F (36.8 °C)  Temp  "Source: Oral  Pulse: (!) 115  Resp: (!) 21  SpO2: 98 %  Height: 5' 9" (175.3 cm)  Weight: 95.8 kg (211 lb 3.2 oz)    Constitutional:       General: He is not in acute distress.     Appearance: Normal appearance. He is not diaphoretic.   HENT:      Head: Normocephalic and atraumatic.      Mouth/Throat:      Mouth: Mucous membranes are moist.      Pharynx: Oropharynx is clear. No oropharyngeal exudate or posterior oropharyngeal erythema.   Eyes:      General: No scleral icterus.     Extraocular Movements: Extraocular movements intact.      Pupils: Pupils are equal, round, and reactive to light.   Cardiovascular:      Rate and Rhythm: Tachycardia present. Rhythm irregular.      Pulses: Normal pulses.      Heart sounds: Normal heart sounds. No murmur heard.    No gallop.   Pulmonary:      Effort: No respiratory distress.      Breath sounds: No stridor. Rales (Bibasilar, improved) present. No wheezing or rhonchi.      Comments: Mild crackles at b/l bases  Abdominal:      General: There is distension.      Tenderness: There is no abdominal tenderness. There is no guarding or rebound.      Comments: No fluid wave present   Musculoskeletal:         General: Swelling (B/L lower extremities) present. No tenderness.      Right lower leg: Edema (1+) present.      Left lower leg: Edema (1+) present.   Skin:     General: Skin is warm and dry.      Capillary Refill: Capillary refill takes less than 2 seconds.      Coloration: Skin is not jaundiced or pale.      Findings: No bruising, erythema or lesion.   Neurological:      General: No focal deficit present.      Mental Status: He is alert and oriented to person, place, and time.   Psychiatric:         Mood and Affect: Mood normal.         Behavior: Behavior normal.         Thought Content: Thought content normal.         Judgment: Judgment normal.     TIME SPENT ON DISCHARGE: 60 minutes    Discharge Medications        Medication List        START taking these medications  "     amiodarone 400 MG tablet  Commonly known as: PACERONE  Take 1 tablet (400 mg total) by mouth 2 (two) times daily for 4 days, THEN 0.5 tablets (200 mg total) 2 (two) times daily for 7 days, THEN 0.5 tablets (200 mg total) once daily.  Start taking on: June 12, 2023     apixaban 5 mg Tab  Commonly known as: ELIQUIS  Take 1 tablet (5 mg total) by mouth 2 (two) times daily.     furosemide 40 MG tablet  Commonly known as: LASIX  Take 1 tablet (40 mg total) by mouth once daily.     sacubitriL-valsartan 24-26 mg per tablet  Commonly known as: ENTRESTO  Take 1 tablet by mouth 2 (two) times daily.     spironolactone 25 MG tablet  Commonly known as: ALDACTONE  Take 1 tablet (25 mg total) by mouth once daily.  Start taking on: June 13, 2023            CONTINUE taking these medications      aspirin 81 MG EC tablet  Commonly known as: ECOTRIN     atorvastatin 20 MG tablet  Commonly known as: LIPITOR     DULoxetine 60 MG capsule  Commonly known as: CYMBALTA     fish oil-omega-3 fatty acids 300-1,000 mg capsule     lamoTRIgine 200 MG tablet  Commonly known as: LAMICTAL     loratadine 10 mg tablet  Commonly known as: CLARITIN     metFORMIN 1000 MG tablet  Commonly known as: GLUCOPHAGE     mirtazapine 30 MG tablet  Commonly known as: REMERON     montelukast 10 mg tablet  Commonly known as: SINGULAIR     traZODone 150 MG tablet  Commonly known as: DESYREL     VITAMIN C 500 MG tablet  Generic drug: ascorbic acid (vitamin C)            STOP taking these medications      ramipriL 10 MG capsule  Commonly known as: ALTACE               Where to Get Your Medications        These medications were sent to [facility] - [address]  [place] [address] [address]      Phone: [phone]   amiodarone 400 MG tablet  apixaban 5 mg Tab  furosemide 40 MG tablet  sacubitriL-valsartan 24-26 mg per tablet  spironolactone 25 MG tablet         Discharge Information:   Mr. Alphonso Wyatt is being  discharged Home or Self Care.    Discharge Procedure Orders   Ambulatory referral/consult to Internal Medicine   Standing Status: Future   Referral Priority: Routine Referral Type: Consultation   Referral Reason: Specialty Services Required   Requested Specialty: Internal Medicine   Number of Visits Requested: 1     Ambulatory referral/consult to Cardiology   Standing Status: Future   Referral Priority: Routine Referral Type: Consultation   Referral Reason: Specialty Services Required   Requested Specialty: Cardiology   Number of Visits Requested: 1        Follow-Up Appointments:    Follow up in post wards with [facility] Dr. Palacios. Patient also needs to be established with PCP  For CHF, prescription sent for entresto 24-26, aldactone 25, lasix 40 daily. Given instructions to log daily weights, limit sodium and fluid intake.   For a fib, prescriptions sent for eliquis and amiodarone taper  Referral to Cardiology clinic was sent for above.   He has completed therapy for CAP, so will not discharge on antibiotics.    The above information was discussed with the patient in clear terms. He was able to repeat the instructions to me in his own words. All questions answered. ED precautions provided.    Yvonne Polo MD  [facility] Internal Medicine, PGY-2

## 2023-06-15 ENCOUNTER — NURSE TRIAGE (OUTPATIENT)
Dept: ADMINISTRATIVE | Facility: CLINIC | Age: 63
End: 2023-06-15

## 2023-06-15 ENCOUNTER — PATIENT MESSAGE (OUTPATIENT)
Dept: ADMINISTRATIVE | Facility: OTHER | Age: 63
End: 2023-06-15

## 2023-06-15 NOTE — TELEPHONE ENCOUNTER
OOC RN  Patient was DC on Monday, 6/12/23  for AFIB,   he has no money for prescriptions   [place], and others.     Dr. Cele Mares.  Needing assistance to obtain meds.  No insurance.   Can someone else help this patient get meds.   Does he need a ?    Denies any symptoms at this time.   For any new or worsening symptoms will call back.   Number given and brenna Jones   [facility] Dr. Mares    [facility].   Paient States at hospital he told someone he could not afford meds and someone apply him in a program and has not heard anything>   Reason for Disposition   Pharmacy calling with prescription question and triager unable to answer question    Additional Information   Negative: Intentional drug overdose and suicidal thoughts or ideas   Negative: MORE THAN A DOUBLE DOSE of a prescription or over-the-counter (OTC) drug   Negative: DOUBLE DOSE (an extra dose or lesser amount) of prescription drug and any symptoms (e.g., dizziness, nausea, pain, sleepiness)   Negative: DOUBLE DOSE (an extra dose or lesser amount) of over-the-counter (OTC) drug and any symptoms (e.g., dizziness, nausea, pain, sleepiness)   Negative: Took another person's prescription drug   Negative: DOUBLE DOSE (an extra dose or lesser amount) of prescription drug and NO symptoms  (Exception: A double dose of antibiotics.)   Negative: Diabetes drug error or overdose (e.g., took wrong type of insulin or took extra dose)   Negative: Caller has medication question about med NOT prescribed by PCP and triager unable to answer question (e.g., compatibility with other med, storage)   Negative: Prescription not at pharmacy and was prescribed by PCP recently  (Exception: triager has access to EMR and prescription is recorded there. Go to Home Care and confirm for pharmacy.)    Protocols used: Medication Question Call-A-OH

## 2023-06-19 ENCOUNTER — PATIENT OUTREACH (OUTPATIENT)
Dept: ADMINISTRATIVE | Facility: OTHER | Age: 63
End: 2023-06-19

## 2023-06-19 NOTE — PROGRESS NOTES
This Community Health Worker (CHW) competed Social Determinant of Health (SDOH)  Questionnaire with patient via telephone today. He agreed to pharmacy assistance and a follow up call regarding status. No additional assistance at this time.

## 2023-06-20 ENCOUNTER — PATIENT MESSAGE (OUTPATIENT)
Dept: INTERNAL MEDICINE | Facility: CLINIC | Age: 63
End: 2023-06-20

## 2023-06-20 ENCOUNTER — TELEPHONE (OUTPATIENT)
Dept: PHARMACY | Facility: CLINIC | Age: 63
End: 2023-06-20

## 2023-06-20 ENCOUNTER — PATIENT MESSAGE (OUTPATIENT)
Dept: ADMINISTRATIVE | Facility: OTHER | Age: 63
End: 2023-06-20

## 2023-06-20 NOTE — LETTER
June 20, 2023    Alphonso Wyatt  [facility] [facility] [address]             [facility] - Pharmacy Assistance  [address]  [place] [address]  Phone: [phone]  Fax: [phone] .Dear Mr. Alphonso Wyatt     It was a pleasure speaking with you. To follow up on our conversation on 6/20/2023, the Pharmacy Patient Assistance Program needs more information from you before we can submit your Eliquis and Entresto application to the [employer]  and [employer] Program. Please return the following documents to the Pharmacy Patient Assistance office (address, email and fax listed below) asap:      Signed & dated OCCP/ Patient Authorization Forms            Whats Next:     Once I receive your documentation and authorization from your Provider, your application will be submitted to the [employer] Assistance Program for review. Please be advised it will take 2 to 4 weeks for your application to be processed so you may have to purchase a month's supply of medication from your pharmacy to hold you over during the waiting period. You will be notified of approval or denial by The Program(mail) or myself.      If you have any questions or concerns, please give me a call         Sincerely   Boo Galicia   [address], [address], [place] [address]

## 2023-06-20 NOTE — TELEPHONE ENCOUNTER
I have spoken with Alphonso Wyatt and informed him of the [employer]  and [employer] application process for Eliquis and Entresto and what's required to apply.  Alphonso Wyatt will provide the following documents: Signed & dated OCCP/ Patient Authorization Forms      I will follow up with the patient in 5 business days.

## 2023-06-21 NOTE — TELEPHONE ENCOUNTER
Hello,       A Patient Assistance Application for Alphonso Lambertoagnnba - MRN  [id number] was faxed to your office @ [phone]. Please have Yvonne Polo MD, review the application to ensure the prescription is correct. If correct, sign and fax the application back to the Pharmacy Patient Assistance Team @[phone].      If changes need to be made to this application, please let me know so corrections can be made, and the application will be faxed back to you for approval and signature.

## 2023-06-22 ENCOUNTER — OFFICE VISIT (OUTPATIENT)
Dept: INTERNAL MEDICINE | Facility: CLINIC | Age: 63
End: 2023-06-22

## 2023-06-22 VITALS
SYSTOLIC BLOOD PRESSURE: 140 MMHG | DIASTOLIC BLOOD PRESSURE: 88 MMHG | TEMPERATURE: 98 F | HEIGHT: 69 IN | WEIGHT: 201 LBS | BODY MASS INDEX: 29.77 KG/M2 | RESPIRATION RATE: 18 BRPM | HEART RATE: 97 BPM

## 2023-06-22 DIAGNOSIS — I50.20 HFREF (HEART FAILURE WITH REDUCED EJECTION FRACTION): ICD-10-CM

## 2023-06-22 DIAGNOSIS — I50.22 CHRONIC SYSTOLIC HEART FAILURE: ICD-10-CM

## 2023-06-22 DIAGNOSIS — I48.91 ATRIAL FIBRILLATION WITH RVR: ICD-10-CM

## 2023-06-22 DIAGNOSIS — I48.0 PAROXYSMAL ATRIAL FIBRILLATION: Primary | ICD-10-CM

## 2023-06-22 DIAGNOSIS — Z79.4 TYPE 2 DIABETES MELLITUS WITHOUT COMPLICATION, WITH LONG-TERM CURRENT USE OF INSULIN: ICD-10-CM

## 2023-06-22 DIAGNOSIS — I10 HYPERTENSION, UNSPECIFIED TYPE: ICD-10-CM

## 2023-06-22 DIAGNOSIS — E11.9 TYPE 2 DIABETES MELLITUS WITHOUT COMPLICATION, WITH LONG-TERM CURRENT USE OF INSULIN: ICD-10-CM

## 2023-06-22 PROCEDURE — 99215 OFFICE O/P EST HI 40 MIN: CPT | Mod: PBBFAC

## 2023-06-22 RX ORDER — LISINOPRIL 20 MG/1
20 TABLET ORAL DAILY
Qty: 90 TABLET | Refills: 3 | Status: SHIPPED | OUTPATIENT
Start: 2023-06-22 | End: 2023-06-22

## 2023-06-22 RX ORDER — LISINOPRIL 20 MG/1
20 TABLET ORAL DAILY
Qty: 90 TABLET | Refills: 3 | Status: SHIPPED | OUTPATIENT
Start: 2023-06-22 | End: 2024-06-21

## 2023-06-22 NOTE — PROGRESS NOTES
[facility] Internal Medicine Resident Clinic    Subjective:        Alphonso Wyatt is a 62 y.o. male who  has a past medical history of Diabetes mellitus and Hypertension. HFrEF (EF 15% 6/2023), paroxysmal atrial fibrillation, HLD He presents to clinic today for hospital follow up and medication reconciliation. Patient was hospitalized to [facility] IM service 6/8/23 after he presented with worsening SOB, cough and chest pain for the past 3 weeks. He was found to have new onset heart failure and atrial fibrillation with RVR that was later rate controlled during his hospitalization. Cardiology was consulted for new onset heart failure, and outpatient LHC was recommended, as well as amiodarone taper and initiation of GDMT. He was discharged on eliquis, amiodarone taper, entresto 24-26, spironolactone 25, and furosemide 40 mg daily. Patient was initially scheduled for post wards follow up on 7/3/23, however was discovered that he did not go home with eliquis and entresto 24-26 due to inability to afford. He has not taken these 2 medications since 6/12/23 when he was discharged. He does have forms for financial assistance from the [employer]. In the meantime, will prescribe xarelto and lisinopril to [facility] pharmacy, and does report that he is financially able to fill these prescriptions. Explained that should financial assistance become available in the future, we will do a lisinopril washout and initiate entresto and switch xarelto to eliquis.     Since hospital discharge, patient reports that he has been feeling well. He has been monitoring daily weights, is currently 201 lb, improved from 211 on hospital discharge 10 days ago. He does report occasional shortness of breath on exertion. Denies LE edema, orthopnea, chest pain. He is requesting clearance to return to work today.     Explained to patient will schedule in new patient slot in 1 month to officially establish care       Review of Systems:  10 point ROS  negative except for HPI    Objective:   Vital Signs:  Vitals:    06/22/23 1440   BP: (!) 140/88   Pulse:    Resp:    Temp:           Body mass index is 29.68 kg/m².     General:  Well developed, well nourished, no acute respiratory distress  Head: Normocephalic, atraumatic  Eyes: PERRL, EOMI, anicteric sclera  Throat: No posterior pharyngeal erythema or exudate, no tonsillar exudate  Neck: supple, normal ROM, no thyromegaly   CVS:  RRR, S1 and S2 normal, no murmurs, no added heart sounds, rubs, gallops, 2+ peripheral pulses  Resp:  Lungs clear to auscultation bilaterally, no wheezes, rales, or rhonci  GI:  Abdomen soft, non-tender, non-distended, normoactive bowel sounds  MSK:  No muscle atrophy, cyanosis, peripheral edema, full range of motion  Skin:  No rashes, ulcers, erythema  Neuro:  Alert and oriented x3, No focal neuro deficits, CNII-XII grossly intact  Psych:  Appropriate mood and affect         Laboratory:  Lab Results   Component Value Date    WBC 11.20 06/12/2023    HGB 14.6 06/12/2023    HCT 42.4 06/12/2023     06/12/2023    MCV 92.6 06/12/2023    RDW 11.9 06/12/2023    Lab Results   Component Value Date     (L) 06/12/2023    K 3.6 06/12/2023    CO2 24 06/12/2023    BUN 15.7 06/12/2023    CREATININE 0.98 06/12/2023    CALCIUM 8.6 (L) 06/12/2023    MG 1.80 06/11/2023    PHOS 5.1 (H) 06/10/2023      Lab Results   Component Value Date    HGBA1C 10.4 (H) 06/07/2023    .8 06/07/2023    CREATININE 0.98 06/12/2023    Lab Results   Component Value Date    TSH 5.666 (H) 06/07/2023    [id number] 1.12 06/07/2023                  Current Medications:  Current Outpatient Medications   Medication Instructions    amiodarone (PACERONE) 400 MG tablet Take 1 tablet (400 mg total) by mouth 2 (two) times daily for 4 days, THEN 0.5 tablets (200 mg total) 2 (two) times daily for 7 days, THEN 0.5 tablets (200 mg total) once daily.    ascorbic acid (vitamin C) (VITAMIN C) 500 mg, Oral, 2 times daily     aspirin (ECOTRIN) 81 mg, Oral, Daily    atorvastatin (LIPITOR) 20 mg, Oral, Nightly    DULoxetine (CYMBALTA) 120 mg, Oral, Daily    furosemide (LASIX) 40 mg, Oral, Daily    lamoTRIgine (LAMICTAL) 200 mg, Oral, Daily    lisinopriL (PRINIVIL,ZESTRIL) 20 mg, Oral, Daily    loratadine (CLARITIN) 10 mg, Oral, Daily    metFORMIN (GLUCOPHAGE) 1,000 mg, Oral, 2 times daily    mirtazapine (REMERON) 30 mg, Oral, Nightly    montelukast (SINGULAIR) 10 mg, Oral, Nightly    omega-3 fatty acids/fish oil (FISH OIL-OMEGA-3 FATTY ACIDS) 300-1,000 mg capsule 1 capsule, Oral, 2 times daily    rivaroxaban (XARELTO) 20 mg, Oral, With dinner    spironolactone (ALDACTONE) 25 mg, Oral, Daily    traZODone (DESYREL) 450 mg, Oral, Nightly        Assessment and Plan:    HFrEF (EF 15% 6/2023)  Suspected non ischemic cardiomyopathy  Paroxysmal atrial fibrillation, now in normal sinus rhythm  -Continue amiodarone taper, aldactone 25 daily, lasix 40 mg daily  -Unable to afford entresto and eliquis. Financial assistance currently pending. Have prescribed lisinopril 20 and xarelto 20 for the meantime. Explained that we can switch in the future if financial assistance is approved  -No symptoms of acute CHF exacerbation at this time   -Patient currently at dry weight, 201 lb. Continue daily weights, sodium and water restriction     Diabetes mellitus - poorly controlled  Continue metformin 1000mg BID, lantus 15 units BID  - Hb A1c: 10.4     Hypertension  -medications as above    HLD  - C/w home Atorvastatin     Hx of Bipolar Disorder/MDD  - C/w home Duloxetine, Mirtazapine, & Lamotrigine    FF up in 1 month in 40 minute new patient  slot to officially establish care     Yvonne Polo MD  [facility] Internal Medicine, PGY-2

## 2023-06-22 NOTE — LETTER
June 22, 2023      [facility]  [facility] [facility] [address]  Phone: [phone]       Patient: Alphonso Wyatt   YOB: 1960  Date of Visit: 06/22/2023    To Whom It May Concern:    Alphonso Wyatt  was at [facility] on 06/22/2023 for post hospital follow up.The patient may return to work on 6/23/2023 with no restrictions. If you have any questions or concerns, or if I can be of further assistance, please do not hesitate to contact me.    Sincerely,    Yvonne Polo MD

## 2023-06-23 NOTE — PROGRESS NOTES
I have reviewed and concur with the resident's history, physical, assessment, and plan.  I have discussed with him all issues related to the diagnosis, workup and treatment plan. Care provided as reasonable and necessary.    Lee Pierce MD  [facility]

## 2023-06-27 ENCOUNTER — PATIENT OUTREACH (OUTPATIENT)
Dept: ADMINISTRATIVE | Facility: OTHER | Age: 63
End: 2023-06-27

## 2023-06-27 NOTE — PROGRESS NOTES
CHW - Outreach Attempt     Community Health Worker left a voicemail message for 1st attempt to contact patient regarding: pharmacy assistance   Community Health Worker to attempt to contact patient on: [phone]

## 2023-06-27 NOTE — TELEPHONE ENCOUNTER
A 2nd attempt has been made to retrieve requested documents from [facility]  via [employer].

## 2023-06-28 ENCOUNTER — PATIENT MESSAGE (OUTPATIENT)
Dept: ADMINISTRATIVE | Facility: OTHER | Age: 63
End: 2023-06-28

## 2023-07-04 ENCOUNTER — PATIENT MESSAGE (OUTPATIENT)
Dept: ADMINISTRATIVE | Facility: OTHER | Age: 63
End: 2023-07-04

## 2023-07-05 ENCOUNTER — PATIENT OUTREACH (OUTPATIENT)
Dept: ADMINISTRATIVE | Facility: OTHER | Age: 63
End: 2023-07-05

## 2023-07-05 NOTE — PROGRESS NOTES
CHW - Case Closure    This Community Health Worker spoke to patient via telephone today.   Pt reported: Mr. Wyatt states he has been in contact with pharmacy assistance and they are awaiting him to submit some paperwork. He stated all is well at this time and agreed to submit his forms.   Pt denied any additional needs at this time and agrees with episode closure at this time.  Provided patient with Community Health Worker's contact information and encouraged him/her to contact this Community Health Worker if additional needs arise.

## 2023-07-11 ENCOUNTER — PATIENT MESSAGE (OUTPATIENT)
Dept: ADMINISTRATIVE | Facility: OTHER | Age: 63
End: 2023-07-11

## 2023-07-16 NOTE — PROGRESS NOTES
[facility] Internal Medicine Resident Clinic    Subjective:        Alphonso Wyatt is a 62 y.o. male who  has a past medical history of Diabetes mellitus and Hypertension. HFrEF (EF 15% 6/2023), paroxysmal atrial fibrillation, HLD He presents to clinic today 7/19/23 to establish care for PCP. He was seen 6/22/23 for hospital follow up and medication reconciliation. Patient was hospitalized to [facility] IM service 6/8/23 after he presented with worsening SOB, cough and chest pain for the past 3 weeks. He was found to have new onset heart failure and atrial fibrillation with RVR that was later rate controlled during his hospitalization. Cardiology was consulted for new onset heart failure, and outpatient LHC was recommended, as well as amiodarone taper and initiation of GDMT. He was discharged on eliquis, amiodarone taper, entresto 24-26, spironolactone 25, and furosemide 40 mg daily. Patient was initially scheduled for post wards follow up on 7/3/23, however was discovered that he did not go home with eliquis and entresto 24-26 due to inability to afford. He has not taken these 2 medications since 6/12/23 when he was discharged. He does have forms for financial assistance from the pharmaceutical [employer]. In the meantime, will prescribe xarelto and lisinopril to [facility] pharmacy, and does report that he is financially able to fill these prescriptions. Explained that should financial assistance become available in the future, we will do a lisinopril washout and initiate entresto and switch xarelto to eliquis.     Since hospital discharge, patient reports that he has been feeling well. He has been monitoring daily weights, is currently 201 lb, improved from 211 on hospital discharge 10 days ago. He does report occasional shortness of breath on exertion. Denies LE edema, orthopnea, chest pain. He is requesting clearance to return to work today.     7/19/23: Patient here for follow up and to officially establish care with PCP.  Reports feeling great today. States he has lost weight and old clothing fits him again. He weights 187 lb today. Reports compliance with all GDMT, no orthopnea, shortness of breath or peripheral edema today. With regards to DM, he was discharged on 15 units long acting insulin BID as well as metformin but states he has not been doing the insulin because he believes his sugars were high due to acute issues during hospitalization. States he wants to see how he does prior to next A1c check in 2 months and reevaluate then.   He is a 50 pack year smoker, has opted to defer lung cancer screening after discussion of risks and benefits. States he will consider FIT and PSA at next appointment, and will get vaccines done at outside facility due to self pay status.     Review of Systems:  10 point ROS negative except for HPI    Objective:   Vital Signs:  Vitals:    07/19/23 1430   BP: 136/87   Pulse: 70   Resp: 18   Temp: 98.1 °F (36.7 °C)        General:  Well developed, well nourished, no acute respiratory distress  Head: Normocephalic, atraumatic  Eyes: PERRL, EOMI, anicteric sclera  Throat: No posterior pharyngeal erythema or exudate, no tonsillar exudate  Neck: supple, normal ROM, no thyromegaly   CVS:  RRR, S1 and S2 normal, no murmurs, no added heart sounds, rubs, gallops, 2+ peripheral pulses  Resp:  Lungs clear to auscultation bilaterally, no wheezes, rales, or rhonci  GI:  Abdomen soft, non-tender, non-distended, normoactive bowel sounds  MSK:  No muscle atrophy, cyanosis, peripheral edema, full range of motion  Skin:  No rashes, ulcers, erythema  Neuro:  Alert and oriented x3, No focal neuro deficits, CNII-XII grossly intact  Psych:  Appropriate mood and affect         Laboratory:  Lab Results   Component Value Date    WBC 11.20 06/12/2023    HGB 14.6 06/12/2023    HCT 42.4 06/12/2023     06/12/2023    MCV 92.6 06/12/2023    RDW 11.9 06/12/2023    Lab Results   Component Value Date     (L) 06/12/2023     K 3.6 06/12/2023    CO2 24 06/12/2023    BUN 15.7 06/12/2023    CREATININE 0.98 06/12/2023    CALCIUM 8.6 (L) 06/12/2023    MG 1.80 06/11/2023    PHOS 5.1 (H) 06/10/2023      Lab Results   Component Value Date    HGBA1C 10.4 (H) 06/07/2023    .8 06/07/2023    CREATININE 0.98 06/12/2023    Lab Results   Component Value Date    TSH 5.666 (H) 06/07/2023    [id number] 1.12 06/07/2023                  Current Medications:  Current Outpatient Medications   Medication Instructions    amiodarone (PACERONE) 400 MG tablet Take 1 tablet (400 mg total) by mouth 2 (two) times daily for 4 days, THEN 0.5 tablets (200 mg total) 2 (two) times daily for 7 days, THEN 0.5 tablets (200 mg total) once daily.    ascorbic acid (vitamin C) (VITAMIN C) 500 mg, Oral, 2 times daily    aspirin (ECOTRIN) 81 mg, Oral, Daily    atorvastatin (LIPITOR) 20 mg, Oral, Nightly    DULoxetine (CYMBALTA) 120 mg, Oral, Daily    furosemide (LASIX) 40 mg, Oral, Daily    lamoTRIgine (LAMICTAL) 200 mg, Oral, Daily    lisinopriL (PRINIVIL,ZESTRIL) 20 mg, Oral, Daily    loratadine (CLARITIN) 10 mg, Oral, Daily    metFORMIN (GLUCOPHAGE) 1,000 mg, Oral, 2 times daily    mirtazapine (REMERON) 30 mg, Oral, Nightly    montelukast (SINGULAIR) 10 mg, Oral, Nightly    omega-3 fatty acids/fish oil (FISH OIL-OMEGA-3 FATTY ACIDS) 300-1,000 mg capsule 1 capsule, Oral, 2 times daily    rivaroxaban (XARELTO) 20 mg, Oral, With dinner    spironolactone (ALDACTONE) 25 mg, Oral, Daily    traZODone (DESYREL) 450 mg, Oral, Nightly        Assessment and Plan:    HFrEF (EF 15% 6/2023)  Suspected non ischemic cardiomyopathy  Paroxysmal atrial fibrillation, now in normal sinus rhythm  -Continue [id number] daily, aldactone 25 daily, lasix 40 mg daily. Jardiance started today for DM (below)  -Unable to afford entresto and eliquis. Financial assistance currently pending. Have prescribed lisinopril 20 and xarelto 20 for the meantime. Explained that we can switch in the future if  financial assistance is approved  -No symptoms of acute CHF exacerbation at this time   -Patient currently at dry weight, 187 lb. Continue daily weights, sodium and water restriction     Diabetes mellitus - poorly controlled  Continue metformin 1000mg BID  Has not been taking insulin BID. Will initiate jardiance today and recheck in 2 months.  - Hb A1c: 10.4 6/2023     Hypertension  -medications as above    HLD  - C/w home Atorvastatin     Hx of Bipolar Disorder/MDD  - C/w home Duloxetine, Mirtazapine, & Lamotrigine    Health Maintenance  Colon cancer screening: will decide prior to next appointment  Lung Cancer screen: declined  Prostate:shared decision making:wants to do PSA next time   Hep C: nonreactive 2023  Dexa: n/a  Vaccines      Pneumonia: will do at outside facility      Annual Flu: will offer when available      Zoster: will do at outside facility       Tdap: done at outside facility      Yvonne Polo MD  [facility] Internal Medicine, PGY-3

## 2023-07-19 ENCOUNTER — OFFICE VISIT (OUTPATIENT)
Dept: INTERNAL MEDICINE | Facility: CLINIC | Age: 63
End: 2023-07-19

## 2023-07-19 VITALS
RESPIRATION RATE: 18 BRPM | WEIGHT: 187.81 LBS | TEMPERATURE: 98 F | SYSTOLIC BLOOD PRESSURE: 136 MMHG | BODY MASS INDEX: 27.82 KG/M2 | HEIGHT: 69 IN | HEART RATE: 70 BPM | DIASTOLIC BLOOD PRESSURE: 87 MMHG

## 2023-07-19 DIAGNOSIS — I10 HYPERTENSION, UNSPECIFIED TYPE: ICD-10-CM

## 2023-07-19 DIAGNOSIS — I48.91 ATRIAL FIBRILLATION WITH RVR: ICD-10-CM

## 2023-07-19 DIAGNOSIS — I50.20 HFREF (HEART FAILURE WITH REDUCED EJECTION FRACTION): ICD-10-CM

## 2023-07-19 DIAGNOSIS — I50.22 CHRONIC SYSTOLIC HEART FAILURE: ICD-10-CM

## 2023-07-19 DIAGNOSIS — E11.9 TYPE 2 DIABETES MELLITUS WITHOUT COMPLICATION, WITH LONG-TERM CURRENT USE OF INSULIN: ICD-10-CM

## 2023-07-19 DIAGNOSIS — Z79.4 TYPE 2 DIABETES MELLITUS WITHOUT COMPLICATION, WITH LONG-TERM CURRENT USE OF INSULIN: ICD-10-CM

## 2023-07-19 DIAGNOSIS — I48.0 PAROXYSMAL ATRIAL FIBRILLATION: Primary | ICD-10-CM

## 2023-07-19 PROCEDURE — 99215 OFFICE O/P EST HI 40 MIN: CPT | Mod: PBBFAC | Performed by: STUDENT IN AN ORGANIZED HEALTH CARE EDUCATION/TRAINING PROGRAM

## 2023-07-21 NOTE — TELEPHONE ENCOUNTER
Patient failed to return documents and sign statement about lose of income. Please instruct the patient to reach out to Boo Galicia   @ [phone] or [email or web address] if he is still in need of assistance.

## 2023-08-08 RX ORDER — AMIODARONE HYDROCHLORIDE 400 MG/1
TABLET ORAL
Qty: 60 TABLET | Refills: 0 | Status: SHIPPED | OUTPATIENT
Start: 2023-08-08 | End: 2024-01-23 | Stop reason: SDUPTHER

## 2023-08-16 LAB
INR PPP: 1.2
PROTHROMBIN TIME: 14.5 SECONDS (ref 11.4–14)

## 2023-09-05 ENCOUNTER — TELEPHONE (OUTPATIENT)
Dept: INTERNAL MEDICINE | Facility: CLINIC | Age: 63
End: 2023-09-05

## 2023-09-07 ENCOUNTER — PATIENT MESSAGE (OUTPATIENT)
Dept: RESEARCH | Facility: HOSPITAL | Age: 63
End: 2023-09-07

## 2023-09-11 NOTE — PROGRESS NOTES
[facility]ident [facility]    Subjective:        Alphonso Wyatt is a 62 y.o. male who  has a past medical history of Diabetes mellitus and Hypertension. HFrEF (EF 15% 6/2023), paroxysmal atrial fibrillation, HLD He presents to clinic today 9/13/23 for follow up. Last office visit was 7/19/23 to establish care for PCP.   Patient was hospitalized to [facility] IM service 6/8/23 after he presented with worsening SOB, cough and chest pain for the past 3 weeks. He was found to have new onset heart failure and atrial fibrillation with RVR that was later rate controlled during his hospitalization. Cardiology was consulted for new onset heart failure, and outpatient LHC was recommended, as well as amiodarone taper and initiation of GDMT. He was discharged on eliquis, amiodarone taper, entresto 24-26, spironolactone 25, and furosemide 40 mg daily. Patient was initially scheduled for post wards follow up on 7/3/23, however was discovered that he did not go home with eliquis and entresto 24-26 due to inability to afford. He has not taken these 2 medications since 6/12/23 when he was discharged. He does have forms for financial assistance from the pharmaceutical [employer]. In the meantime, will prescribe xarelto and lisinopril to [facility] pharmacy, and does report that he is financially able to fill these prescriptions. Explained that should financial assistance become available in the future, we will do a lisinopril washout and initiate entresto and switch xarelto to eliquis.     9/13/23: presents today for follow up. Reports some weight gain since last office visit, which he attributes to regaining his appetite now that he no longer has excess fluid/ edema. Reports compliance with all GDMT, with the exception of Jardiance which he had to stop due to financial issues. Inability to afford. Denies any shortness of breath, chest pain, palpitations, edema, cough, orthopnea today. With regards to DM, has been compliantwith  metformin, did not want to start insulin at last OV. A1c today 10.9. Will trial glipizide, states he is resistant to starting insulin due to financial issues and unwilling to stick himself. Does not want to do FIT or PSA screening    7/19/23: Patient here for follow up and to officially establish care with PCP. Reports feeling great today. States he has lost weight and old clothing fits him again. He weights 187 lb today. Reports compliance with all GDMT, no orthopnea, shortness of breath or peripheral edema today. With regards to DM, he was discharged on 15 units long acting insulin BID as well as metformin but states he has not been doing the insulin because he believes his sugars were high due to acute issues during hospitalization. States he wants to see how he does prior to next A1c check in 2 months and reevaluate then. He is a 50 pack year smoker, has opted to defer lung cancer screening after discussion of risks and benefits. States he will consider FIT and PSA at next appointment, and will get vaccines done at outside facility due to self pay status.     Review of Systems:  10 point ROS negative except for HPI    Objective:   Vital Signs:  Vitals:    09/13/23 1529   BP: (!) 158/92   Pulse: 98   Resp: 18   Temp: 97.9 °F (36.6 °C)       General:  Well developed, well nourished, no acute respiratory distress  Head: Normocephalic, atraumatic  Eyes: PERRL, EOMI, anicteric sclera  Throat: No posterior pharyngeal erythema or exudate, no tonsillar exudate  Neck: supple, normal ROM, no thyromegaly   CVS:  RRR, S1 and S2 normal, no murmurs, no added heart sounds, rubs, gallops, 2+ peripheral pulses  Resp:  Lungs clear to auscultation bilaterally, no wheezes, rales, or rhonci  GI:  Abdomen soft, non-tender, non-distended, normoactive bowel sounds  MSK:  No muscle atrophy, cyanosis, peripheral edema, full range of motion  Skin:  No rashes, ulcers, erythema  Neuro:  Alert and oriented x3, No focal neuro deficits,  CNII-XII grossly intact  Psych:  Appropriate mood and affect         Laboratory:  Lab Results   Component Value Date    WBC 11.20 06/12/2023    HGB 14.6 06/12/2023    HCT 42.4 06/12/2023     06/12/2023    MCV 92.6 06/12/2023    RDW 11.9 06/12/2023    Lab Results   Component Value Date     (L) 06/12/2023    K 3.6 06/12/2023    CO2 24 06/12/2023    BUN 15.7 06/12/2023    CREATININE 0.98 06/12/2023    CALCIUM 8.6 (L) 06/12/2023    MG 1.80 06/11/2023    PHOS 5.1 (H) 06/10/2023      Lab Results   Component Value Date    HGBA1C 10.4 (H) 06/07/2023    .8 06/07/2023    CREATININE 0.98 06/12/2023    Lab Results   Component Value Date    TSH 5.666 (H) 06/07/2023    [id number] 1.12 06/07/2023                  Current Medications:  Current Outpatient Medications   Medication Instructions    amiodarone (PACERONE) 400 MG tablet Take 1 tablet (400 mg total) by mouth 2 (two) times daily for 4 days, THEN 0.5 tablets (200 mg total) 2 (two) times daily for 7 days, THEN 0.5 tablets (200 mg total) once daily.    ascorbic acid (vitamin C) (VITAMIN C) 500 mg, Oral, 2 times daily    aspirin (ECOTRIN) 81 mg, Oral, Daily    atorvastatin (LIPITOR) 20 mg, Oral, Nightly    DULoxetine (CYMBALTA) 120 mg, Oral, Daily    empagliflozin (JARDIANCE) 10 mg, Oral, Daily    furosemide (LASIX) 40 mg, Oral, Daily    lamoTRIgine (LAMICTAL) 200 mg, Oral, Daily    lisinopriL (PRINIVIL,ZESTRIL) 20 mg, Oral, Daily    loratadine (CLARITIN) 10 mg, Oral, Daily    metFORMIN (GLUCOPHAGE) 1,000 mg, Oral, 2 times daily    mirtazapine (REMERON) 30 mg, Oral, Nightly    multivitamin capsule 1 capsule, Oral, Daily    omega-3 fatty acids/fish oil (FISH OIL-OMEGA-3 FATTY ACIDS) 300-1,000 mg capsule 1 capsule, Oral, 2 times daily    rivaroxaban (XARELTO) 20 mg, Oral, With dinner    spironolactone (ALDACTONE) 25 mg, Oral, Daily    traZODone (DESYREL) 450 mg, Oral, Nightly        Assessment and Plan:    HFrEF (EF 15% 6/2023)  Suspected non ischemic  cardiomyopathy  Paroxysmal atrial fibrillation, now in normal sinus rhythm  -Continue [id number] daily, aldactone 25 daily, lasix 40 mg daily. Patient stopped jardiance due to financial issues  -Unable to afford entresto and eliquis. Financial assistance currently pending. Have prescribed lisinopril 20 and xarelto 20 for the meantime. Explained that we can switch in the future if financial assistance is approved  -No symptoms of acute CHF exacerbation at this time   -Patient currently at dry weight, no peripheral edema, shortness of breath or crackles on exam. . Continue daily weights, sodium and water restriction  -Ff up with Cardiology 10/2023     Diabetes mellitus - poorly controlled  Continue metformin 1000mg BID. Stopped Jardiance due to financial issue  - Hb A1c: 10.9 today  -patient does not want to initiate insulin  -will intiiate glipizide 5 daily and reassess      Hypertension  -medications as above    HLD  - C/w home Atorvastatin     Hx of Bipolar Disorder/MDD  - C/w home Duloxetine, Mirtazapine, & Lamotrigine    Health Maintenance  Colon cancer screening: declined  Lung Cancer screen: declined  Prostate:shared decision making:wants to do PSA next time   Hep C: nonreactive 2023  Dexa: n/a  Vaccines      Pneumonia: will do at outside facility      Annual Flu: will offer when available      Zoster: will do at outside facility       Tdap: done at outside facility    Ff up in 3 months    Yvonne Polo MD  [facility] Internal Medicine, PGY-3

## 2023-09-13 ENCOUNTER — OFFICE VISIT (OUTPATIENT)
Dept: INTERNAL MEDICINE | Facility: CLINIC | Age: 63
End: 2023-09-13

## 2023-09-13 VITALS
DIASTOLIC BLOOD PRESSURE: 80 MMHG | BODY MASS INDEX: 28.49 KG/M2 | OXYGEN SATURATION: 100 % | WEIGHT: 192.38 LBS | RESPIRATION RATE: 18 BRPM | SYSTOLIC BLOOD PRESSURE: 136 MMHG | TEMPERATURE: 98 F | HEART RATE: 98 BPM | HEIGHT: 69 IN

## 2023-09-13 DIAGNOSIS — I48.91 ATRIAL FIBRILLATION WITH RVR: ICD-10-CM

## 2023-09-13 DIAGNOSIS — I50.20 HFREF (HEART FAILURE WITH REDUCED EJECTION FRACTION): ICD-10-CM

## 2023-09-13 DIAGNOSIS — E11.9 TYPE 2 DIABETES MELLITUS WITHOUT COMPLICATION, WITH LONG-TERM CURRENT USE OF INSULIN: Primary | ICD-10-CM

## 2023-09-13 DIAGNOSIS — I48.0 PAROXYSMAL ATRIAL FIBRILLATION: ICD-10-CM

## 2023-09-13 DIAGNOSIS — I10 HYPERTENSION, UNSPECIFIED TYPE: ICD-10-CM

## 2023-09-13 DIAGNOSIS — Z79.4 TYPE 2 DIABETES MELLITUS WITHOUT COMPLICATION, WITH LONG-TERM CURRENT USE OF INSULIN: Primary | ICD-10-CM

## 2023-09-13 LAB — HBA1C MFR BLD: 10.9 %

## 2023-09-13 PROCEDURE — 99215 OFFICE O/P EST HI 40 MIN: CPT | Mod: PBBFAC | Performed by: STUDENT IN AN ORGANIZED HEALTH CARE EDUCATION/TRAINING PROGRAM

## 2023-09-13 PROCEDURE — 83036 HEMOGLOBIN GLYCOSYLATED A1C: CPT | Mod: PBBFAC | Performed by: STUDENT IN AN ORGANIZED HEALTH CARE EDUCATION/TRAINING PROGRAM

## 2023-09-13 RX ORDER — GLIPIZIDE 5 MG/1
5 TABLET, FILM COATED, EXTENDED RELEASE ORAL
Qty: 90 TABLET | Refills: 3 | Status: SHIPPED | OUTPATIENT
Start: 2023-09-13 | End: 2024-09-12

## 2023-10-05 ENCOUNTER — OFFICE VISIT (OUTPATIENT)
Dept: CARDIOLOGY | Facility: CLINIC | Age: 63
End: 2023-10-05

## 2023-10-05 VITALS
BODY MASS INDEX: 29.45 KG/M2 | HEART RATE: 126 BPM | TEMPERATURE: 98 F | WEIGHT: 198.81 LBS | RESPIRATION RATE: 18 BRPM | HEIGHT: 69 IN | DIASTOLIC BLOOD PRESSURE: 74 MMHG | OXYGEN SATURATION: 100 % | SYSTOLIC BLOOD PRESSURE: 126 MMHG

## 2023-10-05 DIAGNOSIS — I48.91 ATRIAL FIBRILLATION WITH RVR: ICD-10-CM

## 2023-10-05 DIAGNOSIS — I50.20 HFREF (HEART FAILURE WITH REDUCED EJECTION FRACTION): ICD-10-CM

## 2023-10-05 DIAGNOSIS — Z79.01 ANTICOAGULATED: ICD-10-CM

## 2023-10-05 PROCEDURE — 93005 ELECTROCARDIOGRAM TRACING: CPT

## 2023-10-05 PROCEDURE — 99215 OFFICE O/P EST HI 40 MIN: CPT | Mod: PBBFAC | Performed by: INTERNAL MEDICINE

## 2023-10-05 RX ORDER — METOPROLOL SUCCINATE 25 MG/1
25 TABLET, EXTENDED RELEASE ORAL DAILY
Qty: 90 TABLET | Refills: 3 | Status: SHIPPED | OUTPATIENT
Start: 2023-10-05 | End: 2024-10-04

## 2023-10-05 NOTE — PROGRESS NOTES
10/05/2023 1:33 PM    Subjective:     CHIEF COMPLAINT:   Chief Complaint   Patient presents with    New referral      Afib/CHF  NO cardiac complaints                              HPI:    Mr. Alphonso Wyatt is a 62 y.o. male with PMHx of HFrEF (EF 15% on 06/23), paroxysmal A Fib, DM, HTN, and HLD presents to establish care. Patient was dx with heart failure and A Fib with RVR during a hospitalization in June 2023. Currently taking amiodarone 200 mg, aldactone 25 mg, lasix 40 mg, lisinopril 20 mg, and Xarelto 20 mg. He is unable to afford Entresto and Eliquis. Taking meformin 1000 mg BID and stopped Jardiance due to affordability.     Currently, pt's EKG in office shows A flutter, but the patient denies any sx. He has not had any chest pain ever. Pt denies recent episodes of SOB, HAYNES, palpitations, leg swelling, and dizziness/lightheadedness. He is not aware of being in flutter/Afib at home or work. 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 He sleeps on 2 pillows at night, but feels this has been his baseline for decades.     Patient smoke about 1 ppd and is not interested in quitting. He denies EtOH.     Lab Results   Component Value Date    CREATININE 0.98 06/12/2023    CREATININE 1.01 06/11/2023    CREATININE 1.50 (H) 06/10/2023     Past Medical History    Patient Active Problem List   Diagnosis    CHF (congestive heart failure)       Surgical History    Past Surgical History:   Procedure Laterality Date    RHINOPLASTY      TONSILLECTOMY AND ADENOIDECTOMY      UMBILICAL HERNIA REPAIR         Social History     Socioeconomic History    Marital status: Single   Tobacco Use    Smoking status: Every Day     Current packs/day: 2.00     Average packs/day: 2.0 packs/day for 15.0 years (30.0 ttl pk-yrs)     Types: Cigarettes    Smokeless tobacco: Never    Tobacco comments:     since 14 years old   Substance and Sexual Activity    Alcohol use: Not Currently    Drug use: Not  Currently    Sexual activity: Never     Social Determinants of Health     Financial Resource Strain: Medium Risk (6/19/2023)    Overall Financial Resource Strain (CARDIA)     Difficulty of Paying Living Expenses: Somewhat hard   Food Insecurity: Food Insecurity Present (6/19/2023)    Hunger Vital Sign     Worried About Running Out of Food in the Last Year: Sometimes true     Ran Out of Food in the Last Year: Never true   Transportation Needs: No Transportation Needs (6/19/2023)    PRAPARE - Transportation     Lack of Transportation (Medical): No     Lack of Transportation (Non-Medical): No   Physical Activity: Inactive (6/19/2023)    Exercise Vital Sign     Days of Exercise per Week: 0 days     Minutes of Exercise per Session: 0 min   Stress: Stress Concern Present (6/19/2023)    [employer] - Occupational Stress Questionnaire     Feeling of Stress : Rather much   Social Connections: Socially Isolated (6/19/2023)    Social Connection and Isolation Panel [NHANES]     Frequency of Communication with Friends and Family: More than three times a week     Frequency of Social Gatherings with Friends and Family: Once a week     Attends [employer] Services: Never     Active Member of Clubs or Organizations: No     Attends Club or Organization Meetings: Never     Marital Status: Never    Housing Stability: High Risk (6/19/2023)    Housing Stability Vital Sign     Unable to Pay for Housing in the Last Year: Yes     Number of Places Lived in the Last Year: 1     Unstable Housing in the Last Year: No       Family History   Problem Relation Age of Onset    Diabetes Mother     Heart disease Mother     COPD Maternal Grandfather     Heart disease Maternal Grandfather      Review of patient's allergies indicates:  No Known Allergies    Current Medications    Current Outpatient Medications   Medication Instructions    amiodarone (PACERONE) 400 MG tablet Take 1 tablet (400 mg total) by mouth 2 (two)  "times daily for 4 days, THEN 0.5 tablets (200 mg total) 2 (two) times daily for 7 days, THEN 0.5 tablets (200 mg total) once daily.    ascorbic acid (vitamin C) (VITAMIN C) 500 mg, Oral, 2 times daily    aspirin (ECOTRIN) 81 mg, Oral, Daily    atorvastatin (LIPITOR) 20 mg, Oral, Nightly    DULoxetine (CYMBALTA) 120 mg, Oral, Daily    empagliflozin (JARDIANCE) 10 mg, Oral, Daily    furosemide (LASIX) 40 mg, Oral, Daily    glipiZIDE 5 mg, Oral, With breakfast    lamoTRIgine (LAMICTAL) 200 mg, Oral, Daily    lisinopriL (PRINIVIL,ZESTRIL) 20 mg, Oral, Daily    loratadine (CLARITIN) 10 mg, Oral, Daily    metFORMIN (GLUCOPHAGE) 1,000 mg, Oral, 2 times daily    mirtazapine (REMERON) 30 mg, Oral, Nightly    multivitamin capsule 1 capsule, Oral, Daily    omega-3 fatty acids/fish oil (FISH OIL-OMEGA-3 FATTY ACIDS) 300-1,000 mg capsule 1 capsule, Oral, 2 times daily    rivaroxaban (XARELTO) 20 mg, Oral, With dinner    spironolactone (ALDACTONE) 25 mg, Oral, Daily    traZODone (DESYREL) 450 mg, Oral, Nightly         ROS:   refer to HPI   GEN   No fever  No weakness  RESP  No SOB  No wheezing  SKIN  No pruritus  No rash  CARD  No CP  No palpitations        VASC  No cyanosis  No claudication  HEM   No adenopathy  No easy bruising   GI  No heart burn  No melena    NEURO  No dizziness  No syncope        Objective:     BP Readings from Last 3 Encounters:   10/05/23 126/74   09/13/23 136/80   07/19/23 136/87        Pulse Readings from Last 3 Encounters:   10/05/23 (!) 126   09/13/23 98   07/19/23 70        Temp Readings from Last 3 Encounters:   10/05/23 98.2 °F (36.8 °C)   09/13/23 97.9 °F (36.6 °C) (Oral)   07/19/23 98.1 °F (36.7 °C) (Oral)       Wt Readings from Last 3 Encounters:   10/05/23 90.2 kg (198 lb 12.8 oz)   09/13/23 87.3 kg (192 lb 6.4 oz)   07/19/23 85.2 kg (187 lb 12.8 oz)         PE:  Blood pressure 126/74, pulse (!) 126, temperature 98.2 °F (36.8 °C), resp. rate 18, height 5' 8.9" (1.75 m), weight 90.2 kg (198 lb " 12.8 oz), SpO2 100 %.   GENERAL:  Ambulates  CONSTITUTIONAL:  No acute distress.  Not ill appearing.  NECK:  No cervical adenopathy.  No carotid bruit.  CARDIOVASCULAR:  Tachycardic rate.  Irregularly irregular rhythm.  No murmur.  PULMONARY:  No respiratory distress.  No wheezing.  No crackles.  ABDOMINAL:  No distention.  No tenderness.  MUSCULOSKELETAL:  No deformity.  No edema  SKIN:  No bruising.  No rash.  NEUROLOGICAL:  Oriented x 3.  No weakness.                                                                                                                                                                                                                                                                                                                                                                                                                                                                               CARDIAC TESTING:  Echocardiogram  Results for orders placed during the hospital encounter of 06/07/23    Echo    Interpretation Summary  · The left ventricle is mildly enlarged with concentric remodeling and severely decreased systolic function.  · The estimated ejection fraction is 15%.  · Atrial fibrillation observed.  · Mild right ventricular enlargement with moderately reduced right ventricular systolic function.  · Moderate right atrial enlargement.  · Mild mitral regurgitation.  · Elevated central venous pressure (15 mmHg).  · The estimated PA systolic pressure is 44 mmHg.  · There is mild pulmonary hypertension.  · Mild left atrial enlargement.  · Mild tricuspid regurgitation.      Stress Test  No results found for this or any previous visit.     Coronary Angiogram  No results found for this or any previous visit.     Holter Monitor  No cardiac monitor results found for the past 12 months    Last BMP BMP  Lab Results   Component Value Date     (L) 06/12/2023    K 3.6 06/12/2023    CO2 24 06/12/2023     BUN 15.7 06/12/2023    CREATININE 0.98 06/12/2023    CALCIUM 8.6 (L) 06/12/2023    EGFRNORACEVR >60 06/12/2023      Last CBC     Lab Results   Component Value Date    WBC 11.20 06/12/2023    HGB 14.6 06/12/2023    HCT 42.4 06/12/2023    MCV 92.6 06/12/2023     06/12/2023         BNP    Lab Results   Component Value Date    .9 (H) 06/07/2023     Last lipids    Lab Results   Component Value Date    CHOL 125 06/07/2023    HDL 38 06/07/2023    LDL 76.00 06/07/2023    TRIG 55 06/07/2023    TOTALCHOLEST 3 06/07/2023      LFT     Lab Results   Component Value Date     (H) 06/12/2023     (H) 06/11/2023     (H) 06/10/2023    AST 85 (H) 06/12/2023     (H) 06/11/2023     (H) 06/10/2023       Assessment:     1. Atrial fibrillation with RVR  -A flutter in office. Will start Metoprolol 25 mg and schedule cardioversion.    2. HFrEF (heart failure with reduced ejection fraction)  -Suspect tachyarrhythmia induced cardiomyopathy.     10 Year Cardiovascular Risk:  The ASCVD Risk score (Vijay DK, et al., 2019) failed to calculate for the following reasons:    The valid total cholesterol range is 130 to 320 mg/dL  BMI:  Body mass index is 29.44 kg/m².  Patient is overweight (BMI 25-29.0)  Tobacco:  Smoked 1 packs per day for 50 years  Alcohol:  none    10/05/2023 2:11 PM    Last PCP visit:  9/13/2023    Plan:       PROCEDURE:      Cardioversion  All questions were answered and informed consent was obtained. I had a detailed discussion with the patient regarding risk of stroke, MI, bleeding access site complications including limb loss, allergy, kidney failure including dialysis and death.  The patient understands the risks and benefits and wishes to go ahead with the procedure.    SMOKING CESSATION encouraged to quit Patient declined smoking cessation referral  Smoking cessation education provided.    DIET:  Avoid processed foods and drinks, sugar, salt, fried/greasy foods, and fast  foods    Recommend Mediterranean diet and 10 servings of fruits and vegetables per day    EXERCISE:  on regular basis    FOLLOW UP:  After procedure          Wilfred Nam MS3      Cardiology Attending     Future Appointments   Date Time Provider Department Center   1/3/2024  1:50 PM [facility]

## 2023-10-05 NOTE — PROGRESS NOTES
10/05/2023 1:33 PM    Subjective:     CHIEF COMPLAINT:   Chief Complaint   Patient presents with    New referral      Afib/CHF  NO cardiac complaints                              HPI:    Mr. Alphonso Wyatt is a 62 y.o. male with PMHx of HFrEF (EF 15% on 06/23), paroxysmal A Fib, DM, HTN, and HLD presents to establish care. Patient was dx with heart failure and A Fib with RVR during a hospitalization in June 2023. Currently taking amiodarone 200 mg, aldactone 25 mg, lasix 40 mg, lisinopril 20 mg, and Xarelto 20 mg. He is unable to afford Entresto and Eliquis. Taking meformin 1000 mg BID and stopped Jardiance due to affordability.     Currently, pt's EKG in office shows A flutter, but the patient denies any sx. He has not had any chest pain ever. Pt denies recent episodes of SOB, HAYNES, palpitations, leg swelling, and dizziness/lightheadedness. He is not aware of being in flutter/Afib at home or work. 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 He sleeps on 2 pillows at night, but feels this has been his baseline for decades.     Patient smoke about 1 ppd and is not interested in quitting. He denies EtOH.     Lab Results   Component Value Date    CREATININE 0.98 06/12/2023    CREATININE 1.01 06/11/2023    CREATININE 1.50 (H) 06/10/2023     Past Medical History    Patient Active Problem List   Diagnosis    CHF (congestive heart failure)    Atrial fibrillation with RVR    HFrEF (heart failure with reduced ejection fraction)    Anticoagulated       Surgical History    Past Surgical History:   Procedure Laterality Date    RHINOPLASTY      TONSILLECTOMY AND ADENOIDECTOMY      UMBILICAL HERNIA REPAIR         Social History     Socioeconomic History    Marital status: Single   Tobacco Use    Smoking status: Every Day     Current packs/day: 2.00     Average packs/day: 2.0 packs/day for 15.0 years (30.0 ttl pk-yrs)     Types: Cigarettes    Smokeless tobacco: Never    Tobacco comments:      since 14 years old   Substance and Sexual Activity    Alcohol use: Not Currently    Drug use: Not Currently    Sexual activity: Never     Social Determinants of Health     Financial Resource Strain: Medium Risk (6/19/2023)    Overall Financial Resource Strain (CARDIA)     Difficulty of Paying Living Expenses: Somewhat hard   Food Insecurity: Food Insecurity Present (6/19/2023)    Hunger Vital Sign     Worried About Running Out of Food in the Last Year: Sometimes true     Ran Out of Food in the Last Year: Never true   Transportation Needs: No Transportation Needs (6/19/2023)    PRAPARE - Transportation     Lack of Transportation (Medical): No     Lack of Transportation (Non-Medical): No   Physical Activity: Inactive (6/19/2023)    Exercise Vital Sign     Days of Exercise per Week: 0 days     Minutes of Exercise per Session: 0 min   Stress: Stress Concern Present (6/19/2023)    [employer] - Occupational Stress Questionnaire     Feeling of Stress : Rather much   Social Connections: Socially Isolated (6/19/2023)    Social Connection and Isolation Panel [NHANES]     Frequency of Communication with Friends and Family: More than three times a week     Frequency of Social Gatherings with Friends and Family: Once a week     Attends Sabianism Services: Never     Active Member of Clubs or Organizations: No     Attends Club or Organization Meetings: Never     Marital Status: Never    Housing Stability: High Risk (6/19/2023)    Housing Stability Vital Sign     Unable to Pay for Housing in the Last Year: Yes     Number of Places Lived in the Last Year: 1     Unstable Housing in the Last Year: No       Family History   Problem Relation Age of Onset    Diabetes Mother     Heart disease Mother     COPD Maternal Grandfather     Heart disease Maternal Grandfather      Review of patient's allergies indicates:  No Known Allergies    Current Medications    Current Outpatient Medications   Medication  Instructions    amiodarone (PACERONE) 400 MG tablet Take 1 tablet (400 mg total) by mouth 2 (two) times daily for 4 days, THEN 0.5 tablets (200 mg total) 2 (two) times daily for 7 days, THEN 0.5 tablets (200 mg total) once daily.    ascorbic acid (vitamin C) (VITAMIN C) 500 mg, Oral, 2 times daily    aspirin (ECOTRIN) 81 mg, Oral, Daily    atorvastatin (LIPITOR) 20 mg, Oral, Nightly    DULoxetine (CYMBALTA) 120 mg, Oral, Daily    empagliflozin (JARDIANCE) 10 mg, Oral, Daily    furosemide (LASIX) 40 mg, Oral, Daily    glipiZIDE 5 mg, Oral, With breakfast    lamoTRIgine (LAMICTAL) 200 mg, Oral, Daily    lisinopriL (PRINIVIL,ZESTRIL) 20 mg, Oral, Daily    loratadine (CLARITIN) 10 mg, Oral, Daily    metFORMIN (GLUCOPHAGE) 1,000 mg, Oral, 2 times daily    mirtazapine (REMERON) 30 mg, Oral, Nightly    multivitamin capsule 1 capsule, Oral, Daily    omega-3 fatty acids/fish oil (FISH OIL-OMEGA-3 FATTY ACIDS) 300-1,000 mg capsule 1 capsule, Oral, 2 times daily    rivaroxaban (XARELTO) 20 mg, Oral, With dinner    spironolactone (ALDACTONE) 25 mg, Oral, Daily    traZODone (DESYREL) 450 mg, Oral, Nightly         ROS:   refer to HPI   GEN   No fever  No weakness  RESP  No SOB  No wheezing  SKIN  No pruritus  No rash  CARD  No CP  No palpitations        VASC  No cyanosis  No claudication  HEM   No adenopathy  No easy bruising   GI  No heart burn  No melena    NEURO  No dizziness  No syncope        Objective:     BP Readings from Last 3 Encounters:   10/05/23 126/74   09/13/23 136/80   07/19/23 136/87        Pulse Readings from Last 3 Encounters:   10/05/23 (!) 126   09/13/23 98   07/19/23 70        Temp Readings from Last 3 Encounters:   10/05/23 98.2 °F (36.8 °C)   09/13/23 97.9 °F (36.6 °C) (Oral)   07/19/23 98.1 °F (36.7 °C) (Oral)       Wt Readings from Last 3 Encounters:   10/05/23 90.2 kg (198 lb 12.8 oz)   09/13/23 87.3 kg (192 lb 6.4 oz)   07/19/23 85.2 kg (187 lb 12.8 oz)         PE:  Blood pressure 126/74, pulse (!)  "126, temperature 98.2 °F (36.8 °C), resp. rate 18, height 5' 8.9" (1.75 m), weight 90.2 kg (198 lb 12.8 oz), SpO2 100 %.   GENERAL:  Ambulates  CONSTITUTIONAL:  No acute distress.  Not ill appearing.  NECK:  No cervical adenopathy.  No carotid bruit.  CARDIOVASCULAR:  Tachycardic rate.  Irregularly irregular rhythm.  No murmur.  PULMONARY:  No respiratory distress.  No wheezing.  No crackles.  ABDOMINAL:  No distention.  No tenderness.  MUSCULOSKELETAL:  No deformity.  No edema  SKIN:  No bruising.  No rash.  NEUROLOGICAL:  Oriented x 3.  No weakness.                                                                                                                                                                                                                                                                                                                                                                                                                                                                               CARDIAC TESTING:  Echocardiogram  Results for orders placed during the hospital encounter of 06/07/23    Echo    Interpretation Summary  · The left ventricle is mildly enlarged with concentric remodeling and severely decreased systolic function.  · The estimated ejection fraction is 15%.  · Atrial fibrillation observed.  · Mild right ventricular enlargement with moderately reduced right ventricular systolic function.  · Moderate right atrial enlargement.  · Mild mitral regurgitation.  · Elevated central venous pressure (15 mmHg).  · The estimated PA systolic pressure is 44 mmHg.  · There is mild pulmonary hypertension.  · Mild left atrial enlargement.  · Mild tricuspid regurgitation.      Stress Test  No results found for this or any previous visit.     Coronary Angiogram  No results found for this or any previous visit.     Holter Monitor  No cardiac monitor results found for the past 12 months    Last [facility] BMP  Lab " Results   Component Value Date     (L) 06/12/2023    K 3.6 06/12/2023    CO2 24 06/12/2023    BUN 15.7 06/12/2023    CREATININE 0.98 06/12/2023    CALCIUM 8.6 (L) 06/12/2023    EGFRNORACEVR >60 06/12/2023      Last CBC     Lab Results   Component Value Date    WBC 11.20 06/12/2023    HGB 14.6 06/12/2023    HCT 42.4 06/12/2023    MCV 92.6 06/12/2023     06/12/2023         BNP    Lab Results   Component Value Date    .9 (H) 06/07/2023     Last lipids    Lab Results   Component Value Date    CHOL 125 06/07/2023    HDL 38 06/07/2023    LDL 76.00 06/07/2023    TRIG 55 06/07/2023    TOTALCHOLEST 3 06/07/2023      LFT     Lab Results   Component Value Date     (H) 06/12/2023     (H) 06/11/2023     (H) 06/10/2023    AST 85 (H) 06/12/2023     (H) 06/11/2023     (H) 06/10/2023       Assessment:     1. Atrial fibrillation with RVR  -A flutter in office. Will start Metoprolol 25 mg and schedule cardioversion.    2. HFrEF (heart failure with reduced ejection fraction)  -Suspect tachyarrhythmia induced cardiomyopathy.     10 Year Cardiovascular Risk:  The ASCVD Risk score ([place] DK, et al., 2019) failed to calculate for the following reasons:    The valid total cholesterol range is 130 to 320 mg/dL  BMI:  Body mass index is 29.44 kg/m².  Patient is overweight (BMI 25-29.0)  Tobacco:  Smoked 1 packs per day for 50 years  Alcohol:  none    10/05/2023 2:11 PM    Last PCP visit:  9/13/2023    Plan:       PROCEDURE:      Cardioversion  All questions were answered and informed consent was obtained. I had a detailed discussion with the patient regarding risk of stroke, MI, bleeding access site complications including limb loss, allergy, kidney failure including dialysis and death.  The patient understands the risks and benefits and wishes to go ahead with the procedure.    SMOKING CESSATION encouraged to quit Patient declined smoking cessation referral  Smoking cessation education  provided.    DIET:  Avoid processed foods and drinks, sugar, salt, fried/greasy foods, and fast foods    Recommend Mediterranean diet and 10 servings of fruits and vegetables per day    EXERCISE:  on regular basis    FOLLOW UP:  After procedure        Shahram Lau MD      Cardiology Attending     Future Appointments   Date Time Provider Department Center   10/26/2023  1:00 PM PRE-ADMIT, [facility] NEHEMIAH Esquivel   10/26/2023  1:00 PM CARDIO NEHEMIAH, [facility] CARDIOLOGY [facility] VILLA Villalobos    1/3/2024  1:50 PM Yvonne Polo MD [facility] RES Wilfredo Esquivel

## 2023-10-26 ENCOUNTER — CLINICAL SUPPORT (OUTPATIENT)
Dept: CARDIOLOGY | Facility: CLINIC | Age: 63
End: 2023-10-26

## 2023-10-26 VITALS
OXYGEN SATURATION: 99 % | HEART RATE: 97 BPM | SYSTOLIC BLOOD PRESSURE: 124 MMHG | WEIGHT: 199 LBS | HEIGHT: 69 IN | DIASTOLIC BLOOD PRESSURE: 80 MMHG | RESPIRATION RATE: 18 BRPM | TEMPERATURE: 98 F | BODY MASS INDEX: 29.47 KG/M2

## 2023-10-26 DIAGNOSIS — I48.91 ATRIAL FIBRILLATION WITH RVR: Primary | ICD-10-CM

## 2023-10-26 PROCEDURE — 99214 OFFICE O/P EST MOD 30 MIN: CPT | Mod: PBBFAC

## 2023-10-27 NOTE — NURSING
Incentive spirometer given to pt. This nurse educated pt on how to use it and the importance of using it. Pt voiced understanding.   noted

## 2023-11-01 ENCOUNTER — ANESTHESIA (OUTPATIENT)
Dept: CARDIOLOGY | Facility: HOSPITAL | Age: 63
End: 2023-11-01

## 2023-11-01 ENCOUNTER — HOSPITAL ENCOUNTER (OUTPATIENT)
Dept: CARDIOLOGY | Facility: HOSPITAL | Age: 63
Discharge: HOME | End: 2023-11-01
Attending: INTERNAL MEDICINE

## 2023-11-01 ENCOUNTER — ANESTHESIA EVENT (OUTPATIENT)
Dept: CARDIOLOGY | Facility: HOSPITAL | Age: 63
End: 2023-11-01

## 2023-11-01 VITALS
OXYGEN SATURATION: 98 % | DIASTOLIC BLOOD PRESSURE: 72 MMHG | HEART RATE: 84 BPM | TEMPERATURE: 99 F | BODY MASS INDEX: 30.27 KG/M2 | SYSTOLIC BLOOD PRESSURE: 129 MMHG | RESPIRATION RATE: 18 BRPM | WEIGHT: 199.75 LBS | HEIGHT: 68 IN

## 2023-11-01 DIAGNOSIS — I48.92 ATRIAL FLUTTER: ICD-10-CM

## 2023-11-01 DIAGNOSIS — I48.91 A-FIB: ICD-10-CM

## 2023-11-01 LAB — POCT GLUCOSE: 208 MG/DL (ref 70–110)

## 2023-11-01 PROCEDURE — D9220A PRA ANESTHESIA: ICD-10-PCS | Mod: ,,, | Performed by: NURSE ANESTHETIST, CERTIFIED REGISTERED

## 2023-11-01 PROCEDURE — 92960 CARDIOVERSION ELECTRIC EXT: CPT

## 2023-11-01 PROCEDURE — 63600175 PHARM REV CODE 636 W HCPCS: Performed by: NURSE ANESTHETIST, CERTIFIED REGISTERED

## 2023-11-01 PROCEDURE — 25000003 PHARM REV CODE 250: Performed by: INTERNAL MEDICINE

## 2023-11-01 PROCEDURE — 93005 ELECTROCARDIOGRAM TRACING: CPT

## 2023-11-01 PROCEDURE — 25000003 PHARM REV CODE 250: Performed by: NURSE ANESTHETIST, CERTIFIED REGISTERED

## 2023-11-01 PROCEDURE — D9220A PRA ANESTHESIA: Mod: ,,, | Performed by: NURSE ANESTHETIST, CERTIFIED REGISTERED

## 2023-11-01 RX ORDER — PROPOFOL 10 MG/ML
VIAL (ML) INTRAVENOUS
Status: DISCONTINUED | OUTPATIENT
Start: 2023-11-01 | End: 2023-11-01

## 2023-11-01 RX ORDER — SODIUM CHLORIDE 9 MG/ML
0-999 INJECTION, SOLUTION INTRAVENOUS ONCE
Status: COMPLETED | OUTPATIENT
Start: 2023-11-01 | End: 2023-11-01

## 2023-11-01 RX ORDER — SODIUM CHLORIDE 9 MG/ML
0-999 INJECTION, SOLUTION INTRAVENOUS CONTINUOUS
Status: DISCONTINUED | OUTPATIENT
Start: 2023-11-01 | End: 2023-11-01

## 2023-11-01 RX ORDER — LIDOCAINE HYDROCHLORIDE 20 MG/ML
INJECTION INTRAVENOUS
Status: DISCONTINUED | OUTPATIENT
Start: 2023-11-01 | End: 2023-11-01

## 2023-11-01 RX ADMIN — LIDOCAINE HYDROCHLORIDE 50 MG: 20 INJECTION INTRAVENOUS at 01:11

## 2023-11-01 RX ADMIN — PROPOFOL 100 MG: 10 INJECTION, EMULSION INTRAVENOUS at 01:11

## 2023-11-01 RX ADMIN — SODIUM CHLORIDE 20 ML/HR: 0.9 INJECTION, SOLUTION INTRAVENOUS at 01:11

## 2023-11-01 NOTE — DISCHARGE SUMMARY
[facility] - Echocardiography  Discharge Note  Short Stay    Cardioversion      OUTCOME: Patient tolerated treatment/procedure well without complication and is now ready for discharge.    DISPOSITION: Home or Self Care    FINAL DIAGNOSIS:  successful conversion to NSR    FOLLOWUP: In clinic    DISCHARGE INSTRUCTIONS:  No discharge procedures on file.     TIME SPENT ON DISCHARGE: 5 minutes    Sofie Page, PGY-4  [employer] Cardiology Fellow

## 2023-11-01 NOTE — DISCHARGE INSTRUCTIONS
Please keep scheduled follow up appointments.  Call clinic with questions or concerns at [phone].  Report to the the ER if symptoms warrant.

## 2023-11-01 NOTE — ANESTHESIA PREPROCEDURE EVALUATION
"                                                                                                             11/01/2023  Alphonso Wyatt is a 62 y.o., male.CARDIOVERSION      Vitals:    11/01/23 0745 11/01/23 1338 11/01/23 1340   BP: 114/69 120/74 124/80   BP Location: Right arm Left arm    Patient Position: Lying Lying Lying   Pulse: 91 92 96   Resp: 18 16 18   Temp: 37 °C (98.6 °F)     TempSrc: Oral     SpO2: (!) 92% 95% 98%   Weight: 90.6 kg (199 lb 11.8 oz)     Height: 5' 8.31" (1.735 m)         PMHx of HFrEF (EF 15% on 06/23), paroxysmal A Fib, DM, HTN, and HLD, heavy smoker    dx with heart failure and A Fib with RVR during a hospitalization in June 2023    Active Ambulatory Problems     Diagnosis Date Noted    CHF (congestive heart failure) 06/12/2023    Atrial fibrillation with RVR 10/05/2023    HFrEF (heart failure with reduced ejection fraction) 10/05/2023    Anticoagulated 10/05/2023     Resolved Ambulatory Problems     Diagnosis Date Noted    No Resolved Ambulatory Problems     Past Medical History:   Diagnosis Date    Atrial fibrillation     Atrial flutter     Diabetes mellitus     Hypertension        Past Surgical History:   Procedure Laterality Date    RHINOPLASTY      TONSILLECTOMY AND ADENOIDECTOMY      UMBILICAL HERNIA REPAIR       Vent. Rate : 086 BPM     Atrial Rate : 258 BPM      P-R Int : 000 ms          QRS Dur : 102 ms       QT Int : 384 ms       P-R-T Axes : 000 042 073 degrees      QTc Int : 459 ms     Atrial flutter with variable A-V block   Septal infarct (cited on or before 05-OCT-2023)   Abnormal ECG   When compared with ECG of 05-OCT-2023 13:31,   No significant change was found   Confirmed by Stuart Lantigua MD ([id number]) on 11/1/2023 10:23:42 AM     Referred By: FREYA REES           Confirmed By:Stuart Lantigua MD      Specimen Collected: 11/01/23 08:32 Last Resulted: 11/01/23 10:23              Current Outpatient Medications on File Prior to Encounter   Medication Sig " Dispense Refill    amiodarone (PACERONE) 400 MG tablet Take 1 tablet (400 mg total) by mouth 2 (two) times daily for 4 days, THEN 0.5 tablets (200 mg total) 2 (two) times daily for 7 days, THEN 0.5 tablets (200 mg total) once daily. 60 tablet 0    ascorbic acid, vitamin C, (VITAMIN C) 500 MG tablet Take 500 mg by mouth 2 (two) times daily.      aspirin (ECOTRIN) 81 MG EC tablet Take 81 mg by mouth once daily.      atorvastatin (LIPITOR) 20 MG tablet Take 20 mg by mouth nightly.      DULoxetine (CYMBALTA) 60 MG capsule Take 120 mg by mouth once daily.      furosemide (LASIX) 40 MG tablet Take 1 tablet (40 mg total) by mouth once daily. 90 tablet 3    glipiZIDE 5 MG TR24 Take 1 tablet (5 mg total) by mouth daily with breakfast. 90 tablet 3    lamoTRIgine (LAMICTAL) 200 MG tablet Take 200 mg by mouth once daily.      lisinopriL (PRINIVIL,ZESTRIL) 20 MG tablet Take 1 tablet (20 mg total) by mouth once daily. 90 tablet 3    loratadine (CLARITIN) 10 mg tablet Take 10 mg by mouth once daily.      metFORMIN (GLUCOPHAGE) 1000 MG tablet Take 1,000 mg by mouth 2 (two) times daily.      metoprolol succinate (TOPROL-XL) 25 MG 24 hr tablet Take 1 tablet (25 mg total) by mouth once daily. 90 tablet 3    mirtazapine (REMERON) 30 MG tablet Take 30 mg by mouth every evening.      multivitamin capsule Take 1 capsule by mouth once daily.      omega-3 fatty acids/fish oil (FISH OIL-OMEGA-3 FATTY ACIDS) 300-1,000 mg capsule Take 1 capsule by mouth 2 (two) times a day.      rivaroxaban (XARELTO) 20 mg Tab Take 1 tablet (20 mg total) by mouth daily with dinner or evening meal. 30 tablet 11    spironolactone (ALDACTONE) 25 MG tablet Take 1 tablet (25 mg total) by mouth once daily. 90 tablet 3    traZODone (DESYREL) 150 MG tablet Take 450 mg by mouth every evening.      empagliflozin (JARDIANCE) 10 mg tablet Take 1 tablet (10 mg total) by mouth once daily. (Patient not taking: Reported on 10/5/2023) 90 tablet 3     No  current facility-administered medications on file prior to encounter.     Lab Results   Component Value Date    WBC 10.64 10/26/2023    HGB 15.0 10/26/2023    HCT 44.4 10/26/2023     10/26/2023    CHOL 125 06/07/2023    TRIG 55 06/07/2023    HDL 38 06/07/2023     (H) 06/12/2023    AST 85 (H) 06/12/2023     (L) 10/26/2023    K 4.4 10/26/2023    CREATININE 1.13 10/26/2023    BUN 17.5 10/26/2023    CO2 24 10/26/2023    TSH 5.666 (H) 06/07/2023    INR 1.3 10/26/2023    HGBA1C 10.4 (H) 06/07/2023         Pre-op Assessment    I have reviewed the Patient Summary Reports.     I have reviewed the Nursing Notes. I have reviewed the NPO Status.   I have reviewed the Medications.     Review of Systems  Anesthesia Hx:  No problems with previous Anesthesia  History of prior surgery of interest to airway management or planning: Denies Family Hx of Anesthesia complications.   Denies Personal Hx of Anesthesia complications.   Hematology/Oncology:  Hematology Normal   Oncology Normal     EENT/Dental:EENT/Dental Normal   Cardiovascular:  Cardiovascular Normal     Pulmonary:  Pulmonary Normal    Renal/:  Renal/ Normal     Hepatic/GI:  Hepatic/GI Normal    Musculoskeletal:  Musculoskeletal Normal    Neurological:  Neurology Normal    Endocrine:  Endocrine Normal    Dermatological:  Skin Normal    Psych:  Psychiatric Normal           Physical Exam  General: Cooperative, Well nourished, Alert and Oriented    Airway:  Mallampati: II / III/ II  Mouth Opening: Normal  TM Distance: Normal  Tongue: Normal  Neck ROM: Normal ROM        Anesthesia Plan  Type of Anesthesia, risks & benefits discussed:    Anesthesia Type: Gen Natural Airway  Intra-op Monitoring Plan: Standard ASA Monitors  Post Op Pain Control Plan: IV/PO Opioids PRN  (medical reason for not using multimodal pain management)  Induction:  IV  Informed Consent: Informed consent signed with the Patient and all parties understand the risks and agree with  anesthesia plan.  All questions answered. Patient consented to blood products? No  ASA Score: 4  Day of Surgery Review of History & Physical: H&P Update referred to the surgeon/provider.    Ready For Surgery From Anesthesia Perspective.     .

## 2023-11-01 NOTE — ANESTHESIA POSTPROCEDURE EVALUATION
Anesthesia Post Evaluation    Patient: Alphonso Champagne    Procedure(s) Performed: * No procedures listed *    Final Anesthesia Type: general      Patient location during evaluation: Cath Lab  Patient participation: Yes- Able to Participate  Level of consciousness: awake and alert  Pain management: adequate  Airway patency: patent      Anesthetic complications: no      Cardiovascular status: hemodynamically stable  Respiratory status: unassisted, room air and spontaneous ventilation  Follow-up not needed.            No case tracking events are documented in the log.      Pain/Maximilian Score: No data recorded

## 2023-11-01 NOTE — TRANSFER OF CARE
Anesthesia Transfer of Care Note    Patient: Alphonso Champagne    Procedure(s) Performed: * No procedures listed *    Patient location: Cath Lab    Anesthesia Type: general    Post pain: adequate analgesia    Post assessment: no apparent anesthetic complications    Post vital signs: stable    Level of consciousness: awake    Nausea/Vomiting: no nausea/vomiting    Complications: none    Transfer of care protocol was followed      Last vitals:

## 2023-11-01 NOTE — PROCEDURES
Procedure Note    Date: 11/01/2023  Attending: Dr. Samuel  Fellow: Sofie Page MD  Procedure: Cardioversion  Indication: AF  Anesthesia: MAC  Result: 100 Joules defibrillation converted the patient's AF to NSR  Complications: none    Instructions: continue taking Xarelto daily  Dispo: home today once patient recovers from anesthesia    Sofie Page, PGY-4  [employer] Cardiology Fellow

## 2024-01-19 ENCOUNTER — PATIENT MESSAGE (OUTPATIENT)
Dept: INTERNAL MEDICINE | Facility: CLINIC | Age: 64
End: 2024-01-19

## 2024-01-23 DIAGNOSIS — I48.92 ATRIAL FLUTTER, UNSPECIFIED TYPE: Primary | ICD-10-CM

## 2024-01-23 RX ORDER — AMIODARONE HYDROCHLORIDE 400 MG/1
TABLET ORAL
Qty: 60 TABLET | Refills: 0 | OUTPATIENT
Start: 2024-01-23 | End: 2024-05-02

## 2024-01-23 RX ORDER — AMIODARONE HYDROCHLORIDE 100 MG/1
TABLET ORAL
Qty: 180 TABLET | Refills: 0 | Status: SHIPPED | OUTPATIENT
Start: 2024-01-23 | End: 2024-01-24 | Stop reason: SDUPTHER

## 2024-01-24 ENCOUNTER — TELEPHONE (OUTPATIENT)
Dept: CARDIOLOGY | Facility: CLINIC | Age: 64
End: 2024-01-24

## 2024-01-24 DIAGNOSIS — I48.92 ATRIAL FLUTTER, UNSPECIFIED TYPE: ICD-10-CM

## 2024-01-24 RX ORDER — AMIODARONE HYDROCHLORIDE 100 MG/1
100 TABLET ORAL DAILY
Qty: 90 TABLET | Refills: 3 | Status: SHIPPED | OUTPATIENT
Start: 2024-01-24 | End: 2025-01-23

## 2024-01-24 NOTE — TELEPHONE ENCOUNTER
Can you please clarify what dose of amiodarone the patient should be taking and send refill to pharmacy. Patient is out. [facility] pharmacy.

## 2024-01-25 ENCOUNTER — TELEPHONE (OUTPATIENT)
Dept: INTERNAL MEDICINE | Facility: CLINIC | Age: 64
End: 2024-01-25

## 2024-01-25 DIAGNOSIS — I48.92 ATRIAL FLUTTER, UNSPECIFIED TYPE: ICD-10-CM

## 2024-01-25 NOTE — TELEPHONE ENCOUNTER
Pharmacy requesting a refill on Pacerone 400mg tabs. Qty: 60 Directions: Take 1 tablet by mouth twice daily for 4 days then 1/2 (one-half) twice daily for 7 days then 1/2 (one-half)  once daily      Medication in pt med list different from Rx pharmacy faxed over. Please Adevise

## 2024-03-25 ENCOUNTER — PATIENT MESSAGE (OUTPATIENT)
Dept: INTERNAL MEDICINE | Facility: CLINIC | Age: 64
End: 2024-03-25

## 2024-03-26 RX ORDER — ATORVASTATIN CALCIUM 20 MG/1
20 TABLET, FILM COATED ORAL NIGHTLY
Qty: 90 TABLET | Refills: 3 | Status: SHIPPED | OUTPATIENT
Start: 2024-03-26 | End: 2025-03-26

## 2024-03-26 RX ORDER — METFORMIN HYDROCHLORIDE 1000 MG/1
1000 TABLET ORAL 2 TIMES DAILY
Qty: 180 TABLET | Refills: 3 | Status: SHIPPED | OUTPATIENT
Start: 2024-03-26 | End: 2025-03-26

## 2024-03-26 NOTE — TELEPHONE ENCOUNTER
LV= 9/13/23  NV= None scheduled.    Patient needs to schedule a follow up visit.     Perscriber for these Rx retired.

## 2024-05-23 RX ORDER — SPIRONOLACTONE 25 MG/1
25 TABLET ORAL DAILY
Qty: 90 TABLET | Refills: 3 | Status: SHIPPED | OUTPATIENT
Start: 2024-05-23 | End: 2025-05-23

## 2024-05-23 RX ORDER — FUROSEMIDE 40 MG/1
40 TABLET ORAL DAILY
Qty: 90 TABLET | Refills: 3 | Status: SHIPPED | OUTPATIENT
Start: 2024-05-23 | End: 2025-05-23

## 2024-07-15 ENCOUNTER — TELEPHONE (OUTPATIENT)
Dept: CARDIOLOGY | Facility: CLINIC | Age: 64
End: 2024-07-15
Payer: COMMERCIAL

## 2024-07-15 ENCOUNTER — PATIENT MESSAGE (OUTPATIENT)
Dept: CARDIOLOGY | Facility: CLINIC | Age: 64
End: 2024-07-15
Payer: COMMERCIAL

## 2024-07-15 DIAGNOSIS — I48.0 PAROXYSMAL ATRIAL FIBRILLATION: Primary | ICD-10-CM

## 2024-07-15 NOTE — TELEPHONE ENCOUNTER
IC  I have been trying to refill my Xarelto since last week and it has not been authorized by the doctor yet.  Is it possible you can take care of this soon.  I don't want to run out and I will in 3 days.  Thank you.

## 2024-08-01 ENCOUNTER — OFFICE VISIT (OUTPATIENT)
Dept: CARDIOLOGY | Facility: CLINIC | Age: 64
End: 2024-08-01
Payer: COMMERCIAL

## 2024-08-01 VITALS
OXYGEN SATURATION: 98 % | DIASTOLIC BLOOD PRESSURE: 74 MMHG | HEART RATE: 83 BPM | TEMPERATURE: 98 F | HEIGHT: 68 IN | BODY MASS INDEX: 29.47 KG/M2 | SYSTOLIC BLOOD PRESSURE: 145 MMHG | WEIGHT: 194.44 LBS | RESPIRATION RATE: 20 BRPM

## 2024-08-01 DIAGNOSIS — I50.22 CHRONIC SYSTOLIC HEART FAILURE: ICD-10-CM

## 2024-08-01 DIAGNOSIS — I48.92 ATRIAL FLUTTER, UNSPECIFIED TYPE: ICD-10-CM

## 2024-08-01 DIAGNOSIS — I48.0 PAROXYSMAL ATRIAL FIBRILLATION: ICD-10-CM

## 2024-08-01 DIAGNOSIS — I50.20 HFREF (HEART FAILURE WITH REDUCED EJECTION FRACTION): Primary | ICD-10-CM

## 2024-08-01 PROCEDURE — 99215 OFFICE O/P EST HI 40 MIN: CPT | Mod: PBBFAC,25 | Performed by: INTERNAL MEDICINE

## 2024-08-01 PROCEDURE — 93005 ELECTROCARDIOGRAM TRACING: CPT

## 2024-08-01 RX ORDER — ACETAMINOPHEN AND PHENYLEPHRINE HCL 325; 5 MG/1; MG/1
1 TABLET ORAL NIGHTLY
COMMUNITY

## 2024-08-01 RX ORDER — ATORVASTATIN CALCIUM 20 MG/1
20 TABLET, FILM COATED ORAL NIGHTLY
Qty: 90 TABLET | Refills: 3 | Status: CANCELLED | OUTPATIENT
Start: 2024-08-01 | End: 2025-08-01

## 2024-08-01 RX ORDER — ATORVASTATIN CALCIUM 20 MG/1
20 TABLET, FILM COATED ORAL NIGHTLY
Qty: 90 TABLET | Refills: 3 | Status: SHIPPED | OUTPATIENT
Start: 2024-08-01 | End: 2025-08-01

## 2024-08-01 RX ORDER — ASPIRIN 81 MG/1
81 TABLET ORAL DAILY
Qty: 30 TABLET | Refills: 11 | Status: SHIPPED | OUTPATIENT
Start: 2024-08-01 | End: 2025-08-01

## 2024-08-01 RX ORDER — SPIRONOLACTONE 25 MG/1
25 TABLET ORAL DAILY
Qty: 90 TABLET | Refills: 3 | Status: SHIPPED | OUTPATIENT
Start: 2024-08-01 | End: 2025-08-01

## 2024-08-01 RX ORDER — AMIODARONE HYDROCHLORIDE 100 MG/1
100 TABLET ORAL DAILY
Qty: 90 TABLET | Refills: 3 | Status: SHIPPED | OUTPATIENT
Start: 2024-08-01 | End: 2025-08-01

## 2024-08-01 RX ORDER — FUROSEMIDE 40 MG/1
40 TABLET ORAL DAILY
Qty: 90 TABLET | Refills: 3 | Status: SHIPPED | OUTPATIENT
Start: 2024-08-01 | End: 2025-08-01

## 2024-08-01 RX ORDER — LISINOPRIL 20 MG/1
20 TABLET ORAL DAILY
Qty: 90 TABLET | Refills: 3 | Status: SHIPPED | OUTPATIENT
Start: 2024-08-01 | End: 2025-08-01

## 2024-08-01 NOTE — PATIENT INSTRUCTIONS
Follow up in cardiology in 3 mos  Obtain echocardiogram  Keep follow up appointments with pcp  Strict ED precautions

## 2024-08-01 NOTE — PROGRESS NOTES
[facility] [place] [facility]  Cardiology Clinic      Date of Visit: 8/1/2024  Reason for Visit/Chief Complaint:   Chief Complaint    f/u denies chest pain or sob since LV questions on meds; has dizzness          The patient was discussed with Dr. Lau who agrees with the assessment and plan.        History of Present Illness:        Alphonso Wyatt is a 63 y.o. male with a PMHx atrial fibrillation, HTN, CHD, T2DM who presents to cardiology clinic for follow up. Since his cardioversion he has been feeling generally well. Does endorse some episodes of lightheadeness, mostly at night, that occur roughly once per week. This has been going on roughly for the past 3 months. He denies any palpitations, shortness of breath, chest pain, nausea/vomiting. The episodes are usually very mild, but occasionally he gets very lightheaded and needs to lay down for it to go away. He has not passed out at all. He also endorses a little lightheadedness if he stands up too quickly. He does drink plenty of fluids during the day. Additionally does report taking lasix 40mg PO once a day. He has not had any lower extremity swelling.     PMHx:    Past Medical History:   Diagnosis Date    Atrial fibrillation     Atrial flutter     CHF (congestive heart failure)     Diabetes mellitus     Hypertension         SurgicalHx:   Past Surgical History:   Procedure Laterality Date    RHINOPLASTY      TONSILLECTOMY  1987    TONSILLECTOMY AND ADENOIDECTOMY      UMBILICAL HERNIA REPAIR          FamilyHx:   family history includes COPD in his maternal grandfather; Diabetes in his mother; Heart disease in his maternal grandfather and mother.     SocialHx:    reports that he has been smoking cigarettes. He has a 30 pack-year smoking history. He has never used smokeless tobacco. He reports that he does not drink alcohol and does not use drugs.     Allergies:   Review of patient's allergies indicates:  No  Known Allergies    Home Medications:    Current Outpatient Medications   Medication Sig    amiodarone (PACERONE) 100 MG Tab Take 1 tablet (100 mg total) by mouth once daily.    ascorbic acid, vitamin C, (VITAMIN C) 500 MG tablet Take 500 mg by mouth 2 (two) times daily.    aspirin (ECOTRIN) 81 MG EC tablet Take 81 mg by mouth once daily.    atorvastatin (LIPITOR) 20 MG tablet Take 1 tablet (20 mg total) by mouth nightly.    biotin 10,000 mcg Cap Take 1 tablet by mouth nightly.    DULoxetine (CYMBALTA) 60 MG capsule Take 120 mg by mouth once daily.    furosemide (LASIX) 40 MG tablet Take 1 tablet (40 mg total) by mouth once daily.    glipiZIDE 5 MG TR24 Take 1 tablet (5 mg total) by mouth daily with breakfast.    lamoTRIgine (LAMICTAL) 200 MG tablet Take 200 mg by mouth once daily.    lisinopriL (PRINIVIL,ZESTRIL) 20 MG tablet Take 1 tablet (20 mg total) by mouth once daily.    metFORMIN (GLUCOPHAGE) 1000 MG tablet Take 1 tablet (1,000 mg total) by mouth 2 (two) times daily.    metoprolol succinate (TOPROL-XL) 25 MG 24 hr tablet Take 1 tablet (25 mg total) by mouth once daily.    mirtazapine (REMERON) 30 MG tablet Take 30 mg by mouth every evening.    multivitamin capsule Take 1 capsule by mouth once daily.    omega-3 fatty acids/fish oil (FISH OIL-OMEGA-3 FATTY ACIDS) 300-1,000 mg capsule Take 1 capsule by mouth 2 (two) times a day.    rivaroxaban (XARELTO) 20 mg Tab Take 1 tablet (20 mg total) by mouth daily with dinner or evening meal.    spironolactone (ALDACTONE) 25 MG tablet Take 1 tablet (25 mg total) by mouth once daily.    traZODone (DESYREL) 150 MG tablet Take 450 mg by mouth every evening.    loratadine (CLARITIN) 10 mg tablet Take 10 mg by mouth once daily.     No current facility-administered medications for this visit.        Current Outpatient Medications   Medication Instructions    amiodarone (PACERONE) 100 mg, Oral, Daily    ascorbic acid (vitamin C) (VITAMIN C) 500 mg, Oral, 2 times daily     "aspirin (ECOTRIN) 81 mg, Oral, Daily    atorvastatin (LIPITOR) 20 mg, Oral, Nightly    biotin 10,000 mcg Cap 1 tablet, Oral, Nightly    DULoxetine (CYMBALTA) 120 mg, Oral, Daily    furosemide (LASIX) 40 mg, Oral, Daily    glipiZIDE 5 mg, Oral, With breakfast    lamoTRIgine (LAMICTAL) 200 mg, Oral, Daily    lisinopriL (PRINIVIL,ZESTRIL) 20 mg, Oral, Daily    loratadine (CLARITIN) 10 mg, Oral, Daily    metFORMIN (GLUCOPHAGE) 1,000 mg, Oral, 2 times daily    metoprolol succinate (TOPROL-XL) 25 mg, Oral, Daily    mirtazapine (REMERON) 30 mg, Oral, Nightly    multivitamin capsule 1 capsule, Oral, Daily    omega-3 fatty acids/fish oil (FISH OIL-OMEGA-3 FATTY ACIDS) 300-1,000 mg capsule 1 capsule, Oral, 2 times daily    rivaroxaban (XARELTO) 20 mg, Oral, With dinner    spironolactone (ALDACTONE) 25 mg, Oral, Daily    traZODone (DESYREL) 450 mg, Oral, Nightly                  Objective:     Wt Readings from Last 3 Encounters:   08/01/24 88.2 kg (194 lb 7.1 oz)   11/01/23 90.6 kg (199 lb 11.8 oz)   10/26/23 90.3 kg (199 lb)     Temp Readings from Last 3 Encounters:   08/01/24 98.1 °F (36.7 °C) (Oral)   11/01/23 98.6 °F (37 °C) (Oral)   10/26/23 97.5 °F (36.4 °C)     BP Readings from Last 3 Encounters:   08/01/24 (!) 156/82   11/01/23 129/72   10/26/23 124/80     Pulse Readings from Last 3 Encounters:   08/01/24 80   11/01/23 84   10/26/23 97       Vitals:    08/01/24 1455 08/01/24 1546   BP: (!) 150/80 (!) 156/82   BP Location: Left arm Right arm   Patient Position: Sitting    BP Method: Large (Automatic) Medium (Manual)   Pulse: 80    Resp: 20    Temp: 98.1 °F (36.7 °C)    TempSrc: Oral    SpO2: 98%    Weight: 88.2 kg (194 lb 7.1 oz)    Height: 5' 8" (1.727 m)      Body mass index is 29.57 kg/m².    Physical Examination:  Nursing note and vital signs reviewed.   Constitutional: NAD, not ill-appearing  HEENT: NCAT, PERRLA, normal conjunctivae  Cardiovascular: S1/S2, no chest tenderness to palpation, no abnormal heart sounds " "appreciated  Pulmonary: CTAB, no crackles, wheezes  Abdominal: S/NT/ND  Musculoskeletal: No edema noted to bilateral lower extremities      Neurological: Alert & oriented to self, location, time and situation. At baseline neurological function.  Skin: warm & dry. Capillary refill < 2 seconds.     Labs:   I have reviewed the following labs below:      CBC:  Lab Results   Component Value Date    WBC 10.64 10/26/2023    HGB 15.0 10/26/2023    HCT 44.4 10/26/2023     10/26/2023    MCV 94.5 (H) 10/26/2023    RDW 12.9 10/26/2023     BMP:  Lab Results   Component Value Date     (L) 10/26/2023    K 4.4 10/26/2023    CL 99 10/26/2023    CO2 24 10/26/2023    BUN 17.5 10/26/2023    CALCIUM 9.6 10/26/2023    MG 1.80 06/11/2023    PHOS 5.1 (H) 06/10/2023     LFTs:  Lab Results   Component Value Date    ALBUMIN 3.4 06/12/2023    BILITOT 1.0 06/12/2023    AST 85 (H) 06/12/2023    ALKPHOS 76 06/12/2023     (H) 06/12/2023     FLP:  Cholesterol Total   Date Value Ref Range Status   06/07/2023 125 <=200 mg/dL Final     Comment:     Fasting     HDL Cholesterol   Date Value Ref Range Status   06/07/2023 38 35 - 60 mg/dL Final     Comment:     Fasting     LDL Cholesterol   Date Value Ref Range Status   06/07/2023 76.00 50.00 - 140.00 mg/dL Final     Triglyceride   Date Value Ref Range Status   06/07/2023 55 34 - 140 mg/dL Final     Comment:     Fasting     DM:  Lab Results   Component Value Date    HGBA1C 10.4 (H) 06/07/2023    CREATININE 1.13 10/26/2023     Thyroid:  Lab Results   Component Value Date    TSH 5.666 (H) 06/07/2023     Anemia:No results found for: "IRON", "TIBC", "FERRITIN", "XGAYVAPF86", "FOLATE"  Coags:  Lab Results   Component Value Date    INR 1.3 10/26/2023    APTT 38.3 (H) 10/26/2023      Cardiac:  Lab Results   Component Value Date    TROPONINI 0.040 06/07/2023    .9 (H) 06/07/2023       Cardiovascular Studies/Imaging:   I have reviewed the following studies below:      ECG (): "   Echocardiogram  Results for orders placed during the hospital encounter of 06/07/23    Echo    Interpretation Summary  · The left ventricle is mildly enlarged with concentric remodeling and severely decreased systolic function.  · The estimated ejection fraction is 15%.  · Atrial fibrillation observed.  · Mild right ventricular enlargement with moderately reduced right ventricular systolic function.  · Moderate right atrial enlargement.  · Mild mitral regurgitation.  · Elevated central venous pressure (15 mmHg).  · The estimated PA systolic pressure is 44 mmHg.  · There is mild pulmonary hypertension.  · Mild left atrial enlargement.  · Mild tricuspid regurgitation.      Stress Test  No results found for this or any previous visit.       Coronary Angiogram  No results found for this or any previous visit.         Images:  No results found.         Assessment/Plan:     Alphonso Wyatt is a 63 y.o. male with PMHx HFrEF (EF 15% on 06/23), paroxysmal A Fib, DM, HTN, and HLD  who presents for evaluation/follow up of atrial fibrillation & HFrEF.     1. Atrial fibrillation  -s/p cardioversion in nov2023 w/ return to NSR.  -Repeat EKG in office today was NSR  -Orthostatic vitals in office were negative.  -Continue Toprol-XL 25mg qdaily  -Continue Anticoagulation w/ Xarelto     Heart Failure with Reduced Ejection Fraction    - Etiology: non-ischemic; tachyarrhythmia induced   - LVEF: 15% (TTE on Jun2023)    - GDMT:  lisinopril 20mg qdaily , Metoprolol Succinate 25mg, Spironolactone 25mg,  not on jardiance due to cost   - Diuresis: lasix 40mg qdaily   - Repeat TTE to re-assess LVEF   - Strict Is/Os and daily weights    Follow up in 3 months.     Lane Khan M.D.  [facility] Cardiology Fellow

## 2024-08-02 LAB
OHS QRS DURATION: 102 MS
OHS QTC CALCULATION: 448 MS

## 2024-08-02 NOTE — PROGRESS NOTES
Cardiology Attending    I have discussed Alphonso Yoselin including the patient's symptoms, findings, and management plan with the cardiology fellow.  Please see the Cardiology Note for details.

## 2024-08-12 ENCOUNTER — PATIENT MESSAGE (OUTPATIENT)
Dept: CARDIOLOGY | Facility: CLINIC | Age: 64
End: 2024-08-12
Payer: COMMERCIAL

## 2024-08-12 DIAGNOSIS — Z79.01 ANTICOAGULATED: ICD-10-CM

## 2024-08-12 DIAGNOSIS — I48.91 ATRIAL FIBRILLATION WITH RVR: ICD-10-CM

## 2024-08-12 DIAGNOSIS — I48.0 PAROXYSMAL ATRIAL FIBRILLATION: ICD-10-CM

## 2024-08-12 DIAGNOSIS — I50.20 HFREF (HEART FAILURE WITH REDUCED EJECTION FRACTION): ICD-10-CM

## 2024-08-12 NOTE — TELEPHONE ENCOUNTER
Hi, I was in the office on Aug 1 for a visit and saw Dr. Khan.  He gave me a refill for Xarelto.  I went to refill it today and it was only for 30 days, no refills.  Can he change this?  Thank you.

## 2024-08-13 ENCOUNTER — LAB VISIT (OUTPATIENT)
Dept: LAB | Facility: HOSPITAL | Age: 64
End: 2024-08-13
Attending: INTERNAL MEDICINE
Payer: COMMERCIAL

## 2024-08-13 DIAGNOSIS — I48.92 ATRIAL FLUTTER, UNSPECIFIED TYPE: ICD-10-CM

## 2024-08-13 DIAGNOSIS — I48.91 ATRIAL FIBRILLATION WITH RVR: ICD-10-CM

## 2024-08-13 DIAGNOSIS — I50.22 CHRONIC SYSTOLIC HEART FAILURE: ICD-10-CM

## 2024-08-13 DIAGNOSIS — I50.20 HFREF (HEART FAILURE WITH REDUCED EJECTION FRACTION): ICD-10-CM

## 2024-08-13 DIAGNOSIS — I48.0 PAROXYSMAL ATRIAL FIBRILLATION: ICD-10-CM

## 2024-08-13 DIAGNOSIS — Z79.01 ANTICOAGULATED: ICD-10-CM

## 2024-08-13 LAB
ANION GAP SERPL CALC-SCNC: 10 MEQ/L
BASOPHILS # BLD AUTO: 0.06 X10(3)/MCL
BASOPHILS NFR BLD AUTO: 0.5 %
BUN SERPL-MCNC: 13.8 MG/DL (ref 8.4–25.7)
CALCIUM SERPL-MCNC: 10.1 MG/DL (ref 8.8–10)
CHLORIDE SERPL-SCNC: 91 MMOL/L (ref 98–107)
CO2 SERPL-SCNC: 21 MMOL/L (ref 23–31)
CREAT SERPL-MCNC: 1.26 MG/DL (ref 0.73–1.18)
CREAT/UREA NIT SERPL: 11
EOSINOPHIL # BLD AUTO: 0.21 X10(3)/MCL (ref 0–0.9)
EOSINOPHIL NFR BLD AUTO: 1.9 %
ERYTHROCYTE [DISTWIDTH] IN BLOOD BY AUTOMATED COUNT: 11.7 % (ref 11.5–17)
GFR SERPLBLD CREATININE-BSD FMLA CKD-EPI: >60 ML/MIN/1.73/M2
GLUCOSE SERPL-MCNC: 468 MG/DL (ref 82–115)
HCT VFR BLD AUTO: 37.2 % (ref 42–52)
HGB BLD-MCNC: 13.5 G/DL (ref 14–18)
IMM GRANULOCYTES # BLD AUTO: 0.07 X10(3)/MCL (ref 0–0.04)
IMM GRANULOCYTES NFR BLD AUTO: 0.6 %
LYMPHOCYTES # BLD AUTO: 2.49 X10(3)/MCL (ref 0.6–4.6)
LYMPHOCYTES NFR BLD AUTO: 22.7 %
MCH RBC QN AUTO: 33 PG (ref 27–31)
MCHC RBC AUTO-ENTMCNC: 36.3 G/DL (ref 33–36)
MCV RBC AUTO: 91 FL (ref 80–94)
MONOCYTES # BLD AUTO: 0.7 X10(3)/MCL (ref 0.1–1.3)
MONOCYTES NFR BLD AUTO: 6.4 %
NEUTROPHILS # BLD AUTO: 7.46 X10(3)/MCL (ref 2.1–9.2)
NEUTROPHILS NFR BLD AUTO: 67.9 %
NRBC BLD AUTO-RTO: 0 %
PLATELET # BLD AUTO: 239 X10(3)/MCL (ref 130–400)
PMV BLD AUTO: 8.9 FL (ref 7.4–10.4)
POTASSIUM SERPL-SCNC: 4.4 MMOL/L (ref 3.5–5.1)
RBC # BLD AUTO: 4.09 X10(6)/MCL (ref 4.7–6.1)
SODIUM SERPL-SCNC: 122 MMOL/L (ref 136–145)
WBC # BLD AUTO: 10.99 X10(3)/MCL (ref 4.5–11.5)

## 2024-08-13 PROCEDURE — 85025 COMPLETE CBC W/AUTO DIFF WBC: CPT

## 2024-08-13 PROCEDURE — 80048 BASIC METABOLIC PNL TOTAL CA: CPT

## 2024-08-13 PROCEDURE — 36415 COLL VENOUS BLD VENIPUNCTURE: CPT

## 2024-08-13 NOTE — TELEPHONE ENCOUNTER
LAB CALLED TO GIVE A CRITICAL GLUCOSE VALUE    REPORTED TO  WHO RECOMMEND A ED VISIT   CALLED PATIENT TO RECOMMEND THAT HE GO TO ED FOR CARE  PATIENT STATED THAT IT WAS OK THAT IT WAS A GOOD LEVEL FOR HIM  HE STATED HE USUALLY RUNS HIGHER

## 2024-08-14 RX ORDER — LISINOPRIL 20 MG/1
20 TABLET ORAL DAILY
Qty: 90 TABLET | Refills: 3 | Status: SHIPPED | OUTPATIENT
Start: 2024-08-14 | End: 2025-08-14

## 2024-08-14 RX ORDER — FUROSEMIDE 40 MG/1
40 TABLET ORAL DAILY PRN
Qty: 30 TABLET | Refills: 3 | Status: SHIPPED | OUTPATIENT
Start: 2024-08-14 | End: 2024-08-15 | Stop reason: SDUPTHER

## 2024-08-14 RX ORDER — SPIRONOLACTONE 25 MG/1
25 TABLET ORAL DAILY
Qty: 30 TABLET | Refills: 3 | Status: SHIPPED | OUTPATIENT
Start: 2024-08-14 | End: 2024-08-16 | Stop reason: SDUPTHER

## 2024-08-14 RX ORDER — GLIPIZIDE 5 MG/1
TABLET ORAL
Refills: 0 | OUTPATIENT
Start: 2024-08-14

## 2024-08-14 RX ORDER — METFORMIN HYDROCHLORIDE 1000 MG/1
TABLET ORAL
Refills: 0 | OUTPATIENT
Start: 2024-08-14

## 2024-08-14 RX ORDER — METOPROLOL SUCCINATE 25 MG/1
25 TABLET, EXTENDED RELEASE ORAL DAILY
Qty: 90 TABLET | Refills: 3 | Status: SHIPPED | OUTPATIENT
Start: 2024-08-14 | End: 2025-08-14

## 2024-08-14 RX ORDER — ATORVASTATIN CALCIUM 20 MG/1
20 TABLET, FILM COATED ORAL DAILY
Qty: 90 TABLET | Refills: 3 | Status: SHIPPED | OUTPATIENT
Start: 2024-08-14 | End: 2025-08-14

## 2024-08-15 DIAGNOSIS — I50.20 HFREF (HEART FAILURE WITH REDUCED EJECTION FRACTION): Primary | ICD-10-CM

## 2024-08-15 DIAGNOSIS — I48.0 PAROXYSMAL ATRIAL FIBRILLATION: ICD-10-CM

## 2024-08-15 RX ORDER — FUROSEMIDE 40 MG/1
40 TABLET ORAL DAILY PRN
Qty: 90 TABLET | Refills: 0 | Status: SHIPPED | OUTPATIENT
Start: 2024-08-15 | End: 2025-08-15

## 2024-08-16 RX ORDER — SPIRONOLACTONE 25 MG/1
25 TABLET ORAL DAILY
Qty: 90 TABLET | Refills: 3 | Status: SHIPPED | OUTPATIENT
Start: 2024-08-16 | End: 2025-08-16

## 2024-08-20 DIAGNOSIS — Z79.4 TYPE 2 DIABETES MELLITUS WITHOUT COMPLICATION, WITH LONG-TERM CURRENT USE OF INSULIN: Primary | ICD-10-CM

## 2024-08-20 DIAGNOSIS — E11.9 TYPE 2 DIABETES MELLITUS WITHOUT COMPLICATION, WITH LONG-TERM CURRENT USE OF INSULIN: Primary | ICD-10-CM

## 2024-08-20 RX ORDER — GLIPIZIDE 5 MG/1
5 TABLET, FILM COATED, EXTENDED RELEASE ORAL
Qty: 30 TABLET | Refills: 0 | Status: SHIPPED | OUTPATIENT
Start: 2024-08-20 | End: 2024-09-19

## 2024-08-20 NOTE — TELEPHONE ENCOUNTER
I spoke to patient over the phone and gave him the message from provider. He agrees to get an appointment and to follow up at ED if symptoms of hyperglycemia occurs. Call ended.     Glipizide 5mg. To [facility] in [place].

## 2024-09-13 ENCOUNTER — TELEPHONE (OUTPATIENT)
Dept: INTERNAL MEDICINE | Facility: CLINIC | Age: 64
End: 2024-09-13
Payer: COMMERCIAL

## 2024-09-16 DIAGNOSIS — Z79.4 TYPE 2 DIABETES MELLITUS WITHOUT COMPLICATION, WITH LONG-TERM CURRENT USE OF INSULIN: ICD-10-CM

## 2024-09-16 DIAGNOSIS — E11.9 TYPE 2 DIABETES MELLITUS WITHOUT COMPLICATION, WITH LONG-TERM CURRENT USE OF INSULIN: ICD-10-CM

## 2024-09-16 RX ORDER — GLIPIZIDE 5 MG/1
5 TABLET, FILM COATED, EXTENDED RELEASE ORAL
Qty: 30 TABLET | Refills: 0 | Status: CANCELLED | OUTPATIENT
Start: 2024-09-16 | End: 2024-10-16

## 2024-09-16 NOTE — TELEPHONE ENCOUNTER
Patient has not established care with me, I have never seen this patient.  Patient was given a one-month refill of his supply of glipizide due to elevated glucose, 468.  Patient was informed that he needed to follow up with a primary care provider or establish care and that he would not continue to get refills without having lab work done.  Patient missed appointment on 09/11/2024 to establish care with Dr. Kwan in the IM resident Clinic. Current refill order will be denied.    Richard Angulo DO  [facility] Internal Medicine, PGY-I

## 2024-10-14 ENCOUNTER — HOSPITAL ENCOUNTER (OUTPATIENT)
Dept: CARDIOLOGY | Facility: HOSPITAL | Age: 64
Discharge: HOME | End: 2024-10-14
Attending: STUDENT IN AN ORGANIZED HEALTH CARE EDUCATION/TRAINING PROGRAM
Payer: COMMERCIAL

## 2024-10-14 VITALS
HEIGHT: 68 IN | BODY MASS INDEX: 29.4 KG/M2 | DIASTOLIC BLOOD PRESSURE: 72 MMHG | SYSTOLIC BLOOD PRESSURE: 169 MMHG | WEIGHT: 194 LBS

## 2024-10-14 DIAGNOSIS — I50.20 HFREF (HEART FAILURE WITH REDUCED EJECTION FRACTION): ICD-10-CM

## 2024-10-14 PROCEDURE — 93306 TTE W/DOPPLER COMPLETE: CPT

## 2024-10-15 LAB
APICAL FOUR CHAMBER EJECTION FRACTION: 63 %
APICAL TWO CHAMBER EJECTION FRACTION: 59 %
AV INDEX (PROSTH): 0.68
AV MEAN GRADIENT: 5 MMHG
AV PEAK GRADIENT: 9 MMHG
AV VALVE AREA BY VELOCITY RATIO: 3 CM²
AV VALVE AREA: 3.1 CM²
AV VELOCITY RATIO: 0.67
BSA FOR ECHO PROCEDURE: 2.05 M2
CV ECHO LV RWT: 0.48 CM
DOP CALC AO PEAK VEL: 1.5 M/S
DOP CALC AO VTI: 36 CM
DOP CALC LVOT AREA: 4.5 CM2
DOP CALC LVOT DIAMETER: 2.4 CM
DOP CALC LVOT PEAK VEL: 1 M/S
DOP CALC LVOT STROKE VOLUME: 111.2 CM3
DOP CALC MV VTI: 36.7 CM
DOP CALCLVOT PEAK VEL VTI: 24.6 CM
E WAVE DECELERATION TIME: 189.44 MSEC
E/A RATIO: 0.84
E/E' RATIO: 13.07 M/S
ECHO LV POSTERIOR WALL: 1.2 CM (ref 0.6–1.1)
FRACTIONAL SHORTENING: 34 % (ref 28–44)
HR MV ECHO: 75 BPM
INTERVENTRICULAR SEPTUM: 1.4 CM (ref 0.6–1.1)
LEFT ATRIUM SIZE: 4.06 CM
LEFT INTERNAL DIMENSION IN SYSTOLE: 3.3 CM (ref 2.1–4)
LEFT VENTRICLE DIASTOLIC VOLUME INDEX: 57.22 ML/M2
LEFT VENTRICLE DIASTOLIC VOLUME: 115.59 ML
LEFT VENTRICLE END DIASTOLIC VOLUME APICAL 2 CHAMBER: 97.07 ML
LEFT VENTRICLE END DIASTOLIC VOLUME APICAL 4 CHAMBER: 114.05 ML
LEFT VENTRICLE MASS INDEX: 129.6 G/M2
LEFT VENTRICLE SYSTOLIC VOLUME INDEX: 21.5 ML/M2
LEFT VENTRICLE SYSTOLIC VOLUME: 43.4 ML
LEFT VENTRICULAR INTERNAL DIMENSION IN DIASTOLE: 5 CM (ref 3.5–6)
LEFT VENTRICULAR MASS: 261.8 G
LV LATERAL E/E' RATIO: 12.25 M/S
LV SEPTAL E/E' RATIO: 14 M/S
LVED V (TEICH): 115.59 ML
LVES V (TEICH): 43.4 ML
LVOT MG: 2.14 MMHG
LVOT MV: 0.67 CM/S
MV MEAN GRADIENT: 3 MMHG
MV PEAK A VEL: 1.17 M/S
MV PEAK E VEL: 0.98 M/S
MV PEAK GRADIENT: 5 MMHG
MV STENOSIS PRESSURE HALF TIME: 54.94 MS
MV VALVE AREA BY CONTINUITY EQUATION: 3.03 CM2
MV VALVE AREA P 1/2 METHOD: 4 CM2
OHS LV EJECTION FRACTION SIMPSONS BIPLANE MOD: 63 %
PISA TR MAX VEL: 2.22 M/S
RA PRESSURE ESTIMATED: 3 MMHG
RIGHT VENTRICULAR END-DIASTOLIC DIMENSION: 3.62 CM
RV TB RVSP: 5 MMHG
TDI LATERAL: 0.08 M/S
TDI SEPTAL: 0.07 M/S
TDI: 0.08 M/S
TR MAX PG: 20 MMHG
TRICUSPID ANNULAR PLANE SYSTOLIC EXCURSION: 2.54 CM
TV REST PULMONARY ARTERY PRESSURE: 23 MMHG
Z-SCORE OF LEFT VENTRICULAR DIMENSION IN END DIASTOLE: -1.74
Z-SCORE OF LEFT VENTRICULAR DIMENSION IN END SYSTOLE: -0.8

## 2024-11-19 ENCOUNTER — OFFICE VISIT (OUTPATIENT)
Dept: CARDIOLOGY | Facility: CLINIC | Age: 64
End: 2024-11-19
Payer: COMMERCIAL

## 2024-11-19 VITALS
TEMPERATURE: 98 F | HEIGHT: 68 IN | WEIGHT: 195.38 LBS | BODY MASS INDEX: 29.61 KG/M2 | DIASTOLIC BLOOD PRESSURE: 76 MMHG | RESPIRATION RATE: 20 BRPM | SYSTOLIC BLOOD PRESSURE: 154 MMHG | HEART RATE: 79 BPM | OXYGEN SATURATION: 99 %

## 2024-11-19 DIAGNOSIS — I48.91 ATRIAL FIBRILLATION WITH RVR: ICD-10-CM

## 2024-11-19 DIAGNOSIS — I50.20 HFREF (HEART FAILURE WITH REDUCED EJECTION FRACTION): ICD-10-CM

## 2024-11-19 DIAGNOSIS — Z79.01 ANTICOAGULATED: ICD-10-CM

## 2024-11-19 DIAGNOSIS — I50.22 CHRONIC SYSTOLIC HEART FAILURE: ICD-10-CM

## 2024-11-19 DIAGNOSIS — I48.0 PAROXYSMAL ATRIAL FIBRILLATION: ICD-10-CM

## 2024-11-19 PROCEDURE — 99215 OFFICE O/P EST HI 40 MIN: CPT | Mod: PBBFAC | Performed by: INTERNAL MEDICINE

## 2024-11-19 RX ORDER — METOPROLOL SUCCINATE 50 MG/1
50 TABLET, EXTENDED RELEASE ORAL DAILY
Qty: 90 TABLET | Refills: 3 | Status: SHIPPED | OUTPATIENT
Start: 2024-11-19 | End: 2025-11-19

## 2024-11-19 RX ORDER — LISINOPRIL 40 MG/1
40 TABLET ORAL DAILY
Qty: 90 TABLET | Refills: 3 | Status: SHIPPED | OUTPATIENT
Start: 2024-11-19 | End: 2025-11-19

## 2024-11-19 NOTE — PROGRESS NOTES
[facility] [place] [facility]  Cardiology Clinic      Date of Visit: 11/19/2024  Reason for Visit/Chief Complaint:   Chief Complaint    f/u denies chest pain or sob since LV no questions            History of Present Illness:        Alphonso Wyatt is a 64 y.o. male with a PMHx atrial fibrillation, HTN, CHD, T2DM who presents to cardiology clinic for follow up. Last seen 8/1/2024.  Reports he has been feeling well since previous visits with no acute complaints today.  Denies any chest pain, shortness of breath, dyspnea on exertion, palpitations, lower extremity swelling.  Endorses compliance with medication regimen.  States he has noticed his blood pressure has been high on several readings since last visit but reports he does not check his blood pressure at home.  Also reports he is not currently following with PCP.      PMHx:    Past Medical History:   Diagnosis Date    Atrial fibrillation     Atrial flutter     CHF (congestive heart failure)     Diabetes mellitus     Hypertension         SurgicalHx:   Past Surgical History:   Procedure Laterality Date    RHINOPLASTY      TONSILLECTOMY  1987    TONSILLECTOMY AND ADENOIDECTOMY      UMBILICAL HERNIA REPAIR          FamilyHx:   family history includes COPD in his maternal grandfather; Diabetes in his mother; Heart disease in his maternal grandfather and mother.     SocialHx:    reports that he has been smoking cigarettes. He has a 30 pack-year smoking history. He has never used smokeless tobacco. He reports that he does not drink alcohol and does not use drugs.     Allergies:   Review of patient's allergies indicates:  No Known Allergies    Home Medications:    Current Outpatient Medications   Medication Sig    amiodarone (PACERONE) 100 MG Tab Take 1 tablet (100 mg total) by mouth once daily.    ascorbic acid, vitamin C, (VITAMIN C) 500 MG tablet Take 500 mg by mouth 2 (two) times daily.    aspirin (ECOTRIN) 81 MG EC  tablet Take 1 tablet (81 mg total) by mouth once daily.    atorvastatin (LIPITOR) 20 MG tablet Take 1 tablet (20 mg total) by mouth once daily.    biotin 10,000 mcg Cap Take 1 tablet by mouth nightly.    DULoxetine (CYMBALTA) 60 MG capsule Take 120 mg by mouth once daily.    furosemide (LASIX) 40 MG tablet Take 1 tablet (40 mg total) by mouth daily as needed (3-5 lbs weight gain).    lamoTRIgine (LAMICTAL) 200 MG tablet Take 200 mg by mouth once daily.    lisinopriL (PRINIVIL,ZESTRIL) 20 MG tablet Take 1 tablet (20 mg total) by mouth once daily.    metFORMIN (GLUCOPHAGE) 1000 MG tablet Take 1 tablet (1,000 mg total) by mouth 2 (two) times daily.    metoprolol succinate (TOPROL-XL) 25 MG 24 hr tablet Take 1 tablet (25 mg total) by mouth once daily.    mirtazapine (REMERON) 30 MG tablet Take 30 mg by mouth every evening.    multivitamin capsule Take 1 capsule by mouth once daily.    omega-3 fatty acids/fish oil (FISH OIL-OMEGA-3 FATTY ACIDS) 300-1,000 mg capsule Take 1 capsule by mouth 2 (two) times a day.    rivaroxaban (XARELTO) 20 mg Tab Take 1 tablet (20 mg total) by mouth daily with dinner or evening meal.    spironolactone (ALDACTONE) 25 MG tablet Take 1 tablet (25 mg total) by mouth once daily.    traZODone (DESYREL) 150 MG tablet Take 450 mg by mouth every evening.    glipiZIDE 5 MG TR24 Take 1 tablet (5 mg total) by mouth daily with breakfast.    loratadine (CLARITIN) 10 mg tablet Take 10 mg by mouth once daily.     No current facility-administered medications for this visit.        Current Outpatient Medications   Medication Instructions    amiodarone (PACERONE) 100 mg, Oral, Daily    ascorbic acid (vitamin C) (VITAMIN C) 500 mg, 2 times daily    aspirin (ECOTRIN) 81 mg, Oral, Daily    atorvastatin (LIPITOR) 20 mg, Oral, Daily    biotin 10,000 mcg Cap 1 tablet, Nightly    DULoxetine (CYMBALTA) 120 mg, Daily    furosemide (LASIX) 40 mg, Oral, Daily PRN    glipiZIDE 5 mg, Oral, With breakfast    lamoTRIgine  "(LAMICTAL) 200 mg, Daily    lisinopriL (PRINIVIL,ZESTRIL) 20 mg, Oral, Daily    loratadine (CLARITIN) 10 mg, Oral, Daily    metFORMIN (GLUCOPHAGE) 1,000 mg, Oral, 2 times daily    metoprolol succinate (TOPROL-XL) 25 mg, Oral, Daily    mirtazapine (REMERON) 30 mg, Nightly    multivitamin capsule 1 capsule, Daily    omega-3 fatty acids/fish oil (FISH OIL-OMEGA-3 FATTY ACIDS) 300-1,000 mg capsule 1 capsule, 2 times daily    rivaroxaban (XARELTO) 20 mg, Oral, With dinner    spironolactone (ALDACTONE) 25 mg, Oral, Daily    traZODone (DESYREL) 450 mg, Nightly                  Objective:     Wt Readings from Last 3 Encounters:   11/19/24 88.6 kg (195 lb 6.4 oz)   10/14/24 88 kg (194 lb)   08/01/24 88.2 kg (194 lb 7.1 oz)     Temp Readings from Last 3 Encounters:   11/19/24 97.7 °F (36.5 °C) (Oral)   08/01/24 98.1 °F (36.7 °C) (Oral)   11/01/23 98.6 °F (37 °C) (Oral)     BP Readings from Last 3 Encounters:   11/19/24 (!) 175/79   10/14/24 (!) 169/72   08/01/24 (!) 145/74     Pulse Readings from Last 3 Encounters:   11/19/24 79   08/01/24 83   11/01/23 84       Vitals:    11/19/24 1308   BP: (!) 175/79   BP Location: Left arm   Patient Position: Sitting   Pulse: 79   Resp: 20   Temp: 97.7 °F (36.5 °C)   TempSrc: Oral   SpO2: 99%   Weight: 88.6 kg (195 lb 6.4 oz)   Height: 5' 8" (1.727 m)     Body mass index is 29.71 kg/m².    Physical Examination:  Nursing note and vital signs reviewed.   Constitutional: NAD, not ill-appearing  HEENT: NCAT, PERRLA, normal conjunctivae  Cardiovascular: S1/S2, no chest tenderness to palpation, no abnormal heart sounds appreciated  Pulmonary: CTAB, no crackles, wheezes  Abdominal: S/NT/ND  Musculoskeletal: No edema noted to bilateral lower extremities      Neurological: Alert & oriented to self, location, time and situation. At baseline neurological function.  Skin: warm & dry. Capillary refill < 2 seconds.     Labs:   I have reviewed the following labs below:      CBC:  Lab Results   Component " "Value Date    WBC 10.99 08/13/2024    HGB 13.5 (L) 08/13/2024    HCT 37.2 (L) 08/13/2024     08/13/2024    MCV 91.0 08/13/2024    RDW 11.7 08/13/2024     BMP:  Lab Results   Component Value Date     (L) 08/13/2024    K 4.4 08/13/2024    CL 91 (L) 08/13/2024    CO2 21 (L) 08/13/2024    BUN 13.8 08/13/2024    CALCIUM 10.1 (H) 08/13/2024    MG 1.80 06/11/2023    PHOS 5.1 (H) 06/10/2023     LFTs:  Lab Results   Component Value Date    ALBUMIN 3.4 06/12/2023    BILITOT 1.0 06/12/2023    AST 85 (H) 06/12/2023    ALKPHOS 76 06/12/2023     (H) 06/12/2023     FLP:  Cholesterol Total   Date Value Ref Range Status   06/07/2023 125 <=200 mg/dL Final     Comment:     Fasting     HDL Cholesterol   Date Value Ref Range Status   06/07/2023 38 35 - 60 mg/dL Final     Comment:     Fasting     LDL Cholesterol   Date Value Ref Range Status   06/07/2023 76.00 50.00 - 140.00 mg/dL Final     Triglyceride   Date Value Ref Range Status   06/07/2023 55 34 - 140 mg/dL Final     Comment:     Fasting     DM:  Lab Results   Component Value Date    HGBA1C 10.4 (H) 06/07/2023    CREATININE 1.26 (H) 08/13/2024     Thyroid:  Lab Results   Component Value Date    TSH 5.666 (H) 06/07/2023     Anemia:No results found for: "IRON", "TIBC", "FERRITIN", "[id number]", "FOLATE"  Coags:  Lab Results   Component Value Date    INR 1.3 10/26/2023    APTT 38.3 (H) 10/26/2023      Cardiac:  Lab Results   Component Value Date    TROPONINI 0.040 06/07/2023    .9 (H) 06/07/2023       Cardiovascular Studies/Imaging:   I have reviewed the following studies below:      ECG ():   Echocardiogram  Results for orders placed during the hospital encounter of 06/07/23    Echo    Interpretation Summary  · The left ventricle is mildly enlarged with concentric remodeling and severely decreased systolic function.  · The estimated ejection fraction is 15%.  · Atrial fibrillation observed.  · Mild right ventricular enlargement with moderately reduced right " ventricular systolic function.  · Moderate right atrial enlargement.  · Mild mitral regurgitation.  · Elevated central venous pressure (15 mmHg).  · The estimated PA systolic pressure is 44 mmHg.  · There is mild pulmonary hypertension.  · Mild left atrial enlargement.  · Mild tricuspid regurgitation.    Echo 10/14/2024  Left Ventricle: The left ventricle is normal in size. Normal wall thickness. There is normal systolic function. Biplane (2D) method of discs ejection fraction is 63%.    Right Ventricle: Normal right ventricular cavity size. Systolic function is normal.    Left Atrium: Left atrium is mildly dilated.    Aortic Valve: The aortic valve is a trileaflet valve. There is mild aortic valve sclerosis. There is no stenosis. Aortic valve peak velocity is 1.5 m/s. Mean gradient is 5.0 mmHg.    Mitral Valve: The mitral valve is structurally normal. There is normal leaflet mobility. The mean pressure gradient across the mitral valve is 3 mmHg at a heart rate of 75 bpm.    Tricuspid Valve: There is trace regurgitation.    Pulmonary Artery: The estimated pulmonary artery systolic pressure is 23 mmHg.    IVC/SVC: Normal venous pressure at 3 mmHg.  Stress Test  No results found for this or any previous visit.       Coronary Angiogram  No results found for this or any previous visit.     Images:  No results found.         Assessment/Plan:     Alphonso Wyatt is a 64 y.o. male with PMHx HFrEF (EF 15% on 06/23), paroxysmal A Fib, DM, HTN, and HLD  who presents for evaluation/follow up of atrial fibrillation & HFrEF.     1. Atrial fibrillation  -s/p cardioversion in nov 2023 w/ return to NSR.  -Repeat EKG in office was NSR  -Orthostatic vitals in office were negative.  -Discontinuing amiodarone today, increasing Toprol-XL to 50mg qdaily  -Continue Anticoagulation w/ Xarelto     Heart Failure with reduced Ejection Fraction - recovered    - Etiology: non-ischemic; tachyarrhythmia induced   - LVEF: 15% (TTE on Jun2023)   -   repeat TTE 10/14/2024 showing recovery of EF to 65%   - GDMT:  lisinopril 40mg qdaily , Metoprolol Succinate 50mg, Spironolactone 25mg,  not on jardiance due to cost   - Diuresis: lasix 40mg qdaily PRN    Hypertension   -increasing Lisinopril to 40mg daily   -nursing visit in 2 weeks for blood pressure monitoring     Offered patient referral to PCP for management of other chronic conditions but patient declined.      Follow up in 6 months.     Roxy Camacho M.D.  [facility] IM, PGY-3

## 2024-11-19 NOTE — PATIENT INSTRUCTIONS
Reminders:    Take medications 1-2 hours before your nurse visit and bring ALL medications and your blood pressure log with you to the visit.

## 2025-07-23 ENCOUNTER — HOSPITAL ENCOUNTER (EMERGENCY)
Facility: HOSPITAL | Age: 65
Discharge: HOME | End: 2025-07-23
Attending: EMERGENCY MEDICINE
Payer: MEDICAID

## 2025-07-23 VITALS
BODY MASS INDEX: 29 KG/M2 | HEART RATE: 86 BPM | HEIGHT: 68 IN | SYSTOLIC BLOOD PRESSURE: 157 MMHG | TEMPERATURE: 98 F | RESPIRATION RATE: 18 BRPM | OXYGEN SATURATION: 95 % | DIASTOLIC BLOOD PRESSURE: 69 MMHG | WEIGHT: 191.31 LBS
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
## 2025-07-23 DIAGNOSIS — S42.211A CLOSED FRACTURE OF NECK OF RIGHT HUMERUS, INITIAL ENCOUNTER: Primary | ICD-10-CM

## 2025-07-23 DIAGNOSIS — S49.91XA ARM INJURY, RIGHT, INITIAL ENCOUNTER: ICD-10-CM

## 2025-07-23 PROCEDURE — 99284 EMERGENCY DEPT VISIT MOD MDM: CPT | Mod: 25

## 2025-07-23 RX ORDER — HYDROCODONE BITARTRATE AND ACETAMINOPHEN 5; 325 MG/1; MG/1
1 TABLET ORAL EVERY 8 HOURS PRN
Qty: 15 TABLET | Refills: 0 | Status: SHIPPED | OUTPATIENT
Start: 2025-07-23 | End: 2025-07-28

## 2025-07-23 NOTE — ED PROVIDER NOTES
Encounter Date: 7/23/2025       History     Chief Complaint   Patient presents with    Ortho Referral     Patient states that he has a right arm fracture and diagnosed yesterday at [facility]. They sent a referral to ortho, but his insurance is not accepted by provider. He is requesting ortho referral today.      Patient is a 64-year-old male presents for evaluation of his right arm.  Reports experiencing a fall on Monday with subsequent ER visit to [facility] ED yesterday.  Reports imaging at that time displayed a fracture of his right upper arm.  The patient reports being referred to [facility] however they do not accept his insurance.  The patient reports coming to [facility] for a referral.  Denies hitting his head during injury, LOC, neck pain, or loss of bowel or bladder control.  The patient reports he initially had no pain to his ribs/chest; however, now he is experiencing right-sided rib pain.  Chronic medical conditions include CHF, diabetes, hypertension, and atrial fibrillation.  Patient is currently on anticoagulation therapy; denies rectal bleeding, black stool, or maroon stool, or free bleeding.  The patient is currently wearing sling from previous ER visit.      Review of patient's allergies indicates:  No Known Allergies  Past Medical History:   Diagnosis Date    Atrial fibrillation     Atrial flutter     CHF (congestive heart failure)     Diabetes mellitus     Hypertension      Past Surgical History:   Procedure Laterality Date    RHINOPLASTY      TONSILLECTOMY  1987    TONSILLECTOMY AND ADENOIDECTOMY      UMBILICAL HERNIA REPAIR       Family History   Problem Relation Name Age of Onset    Diabetes Mother Salvador Santos     Heart disease Mother Salvador Santos     COPD Maternal Grandfather Chris Wyatt     Heart disease Maternal Grandfather Chris Wyatt      Social History[1]  Review of Systems   Constitutional:  Negative for chills, diaphoresis, fatigue and  fever.   HENT:  Negative for facial swelling, postnasal drip, rhinorrhea, sinus pressure, sinus pain, sore throat and trouble swallowing.    Respiratory:  Negative for cough, chest tightness, shortness of breath and wheezing.    Cardiovascular:  Positive for chest pain (Rt ribs). Negative for palpitations and leg swelling.   Gastrointestinal:  Negative for abdominal pain, diarrhea, nausea and vomiting.   Genitourinary:  Negative for dysuria, flank pain, hematuria and urgency.   Musculoskeletal:  Positive for arthralgias. Negative for back pain and myalgias.   Skin:  Negative for color change and rash.   Neurological:  Negative for dizziness, syncope, weakness and headaches.   Hematological:  Does not bruise/bleed easily.   All other systems reviewed and are negative.      Physical Exam     Initial Vitals [07/23/25 0909]   BP Pulse Resp Temp SpO2   (!) 157/69 86 18 98.1 °F (36.7 °C) 95 %      MAP       --         Physical Exam    Nursing note and vitals reviewed.  Constitutional: Vital signs are normal. He appears well-developed and well-nourished.   HENT:   Head: Normocephalic.   Nose: Nose normal. Mouth/Throat: Oropharynx is clear and moist.   Eyes: Conjunctivae and EOM are normal. Pupils are equal, round, and reactive to light.   Neck: Neck supple.   Normal range of motion.  Cardiovascular:  Normal rate, regular rhythm, normal heart sounds and intact distal pulses.           Pulmonary/Chest: Effort normal and breath sounds normal. No respiratory distress. He has no wheezes. He has no rhonchi. He has no rales. He exhibits tenderness. He exhibits no edema, no deformity and no retraction.     Abdominal: Abdomen is soft and flat. Bowel sounds are normal. There is no abdominal tenderness. There is no rebound, no guarding, no tenderness at McBurney's point and negative Hamlin's sign.   Musculoskeletal:         General: Normal range of motion.      Right upper arm: Tenderness present. No swelling or edema.         Arms:       Cervical back: Normal range of motion and neck supple.     Neurological: He is alert and oriented to person, place, and time. He has normal strength.   Skin: Skin is warm and dry. Capillary refill takes less than 2 seconds.   Psychiatric: He has a normal mood and affect. His behavior is normal. Judgment and thought content normal.         ED Course   Procedures  Labs Reviewed - No data to display       Imaging Results              X-Ray Ribs 2 View Right (Final result)  Result time 07/23/25 10:21:56      Final result by Freddy Sinclair MD (07/23/25 10:21:56)                   Impression:      No rib fracture seen.      Electronically signed by: Freddy Sinclair  Date:    07/23/2025  Time:    10:21               Narrative:    EXAMINATION:  XR RIBS 2 VIEW RIGHT    CLINICAL HISTORY:  Unspecified fall, initial encounter    COMPARISON:  9 July 2023    FINDINGS:  Two views right ribs.  No convincing rib fracture seen.  No pneumothorax.  Humerus fracture better evaluated on dedicated films.                                       X-Ray Humerus 2 View Right (Final result)  Result time 07/23/25 10:14:14      Final result by Freddy Sinclair MD (07/23/25 10:14:14)                   Impression:      Humeral neck fracture.      Electronically signed by: Freddy Sinclair  Date:    07/23/2025  Time:    10:14               Narrative:    EXAMINATION:  XR HUMERUS 2 VIEW RIGHT    CLINICAL HISTORY:  Unspecified injury of right shoulder and upper arm, initial encounter    COMPARISON:  None    FINDINGS:  Two views right humerus.  There is mildly displaced fracture of the humeral neck.                                       X-Ray Chest 1 View (Final result)  Result time 07/23/25 09:58:13      Final result by Valentin Gibson MD (07/23/25 09:58:13)                   Impression:      No acute chest disease is identified.      Electronically signed by: Valentin Gibson  Date:    07/23/2025  Time:    09:58               Narrative:     EXAMINATION:  XR CHEST 1 VIEW    CLINICAL HISTORY:  , Unspecified fall, initial encounter.    FINDINGS:  No alveolar consolidation, effusion, or pneumothorax is seen.   The thoracic aorta is normal  cardiac silhouette, central pulmonary vessels and mediastinum are normal in size and are grossly unremarkable.   visualized osseous structures are grossly unremarkable.                                       Medications - No data to display  Medical Decision Making  Differential:   Fracture   Pneumothorax   Accidental fall    Amount and/or Complexity of Data Reviewed  Radiology: ordered.    Risk  Prescription drug management.               ED Course as of 07/23/25 1046   Wed Jul 23, 2025   1040 Given strict ED return precautions. I have spoken with the patient and/or caregivers. I have explained the patient's condition, diagnoses and treatment plan based on the information available to me at this time. I have answered the patient's and/or caregiver's questions and addressed any concerns. The patient and/or caregivers have as good an understanding of the patient's diagnosis, condition and treatment plan as can be expected at this point. The vital signs have been stable. The patient's condition is stable and appropriate for discharge from the emergency department.      The patient will pursue further outpatient evaluation with the primary care physician or other designated or consulting physician as outlined in the discharge instructions. The patient and/or caregivers are agreeable to this plan of care and follow-up instructions have been explained in detail. The patient and/or caregivers have received these instructions in written format and have expressed an understanding of the discharge instructions. The patient and/or caregivers are aware that any significant change in condition or worsening of symptoms should prompt an immediate return to this or the closest emergency department or a call to 911.   [JA]      ED Course  User Index  [JA] Freddy Murillo Jr., DNP                           This chart is generated using a voice recognition system. Grammatical and content areas may inadvertently be generated in error. Please contact me if you find a perceive any inappropriate information in this chart. Thank you.       Clinical Impression:  Final diagnoses:  [S49.91XA] Arm injury, right, initial encounter  [W19.XXXA] Fall  [S42.211A] Closed fracture of neck of right humerus, initial encounter (Primary)          ED Disposition Condition    Discharge Stable          ED Prescriptions       Medication Sig Dispense Start Date End Date Auth. Provider    HYDROcodone-acetaminophen (NORCO) 5-325 mg per tablet Take 1 tablet by mouth every 8 (eight) hours as needed for Pain. 15 tablet 7/23/2025 7/28/2025 Freddy Murillo Jr., DNP          Follow-up Information       Follow up With Specialties Details Why Contact Info [facility] Emergency Medicine In 3 days As needed, If symptoms worsen [address] [place] [phone]    [facility]  Schedule an appointment as soon as possible for a visit in 5 days For follow up with Orthopedic Clinic in next 5-7 days to establish care [address] [place] [phone]                   Freddy Murillo Jr., DNP  07/23/25 1045       [1]   Social History  Tobacco Use    Smoking status: Every Day     Current packs/day: 2.00     Average packs/day: 2.0 packs/day for 15.0 years (30.0 ttl pk-yrs)     Types: Cigarettes    Smokeless tobacco: Never    Tobacco comments:     since 14 years old   Substance Use Topics    Alcohol use: Never    Drug use: Never        Freddy Murillo Jr., DNP  07/23/25 1049

## 2025-07-23 NOTE — DISCHARGE INSTRUCTIONS
Wear sling as directed per ED instructions.  Take pain medication as prescribed.  Follow up with the [facility]  Orthopedic Clinic as instructed.   Return to the [facility] ED immediately for coolness to affected extremity, worsening pain, or loss of sensation to affected extremity.

## 2025-07-30 ENCOUNTER — HOSPITAL ENCOUNTER (OUTPATIENT)
Dept: RADIOLOGY | Facility: HOSPITAL | Age: 65
Discharge: HOME | End: 2025-07-30
Attending: STUDENT IN AN ORGANIZED HEALTH CARE EDUCATION/TRAINING PROGRAM
Payer: MEDICAID

## 2025-07-30 ENCOUNTER — OFFICE VISIT (OUTPATIENT)
Dept: ORTHOPEDICS | Facility: CLINIC | Age: 65
End: 2025-07-30
Payer: MEDICAID

## 2025-07-30 VITALS
RESPIRATION RATE: 20 BRPM | OXYGEN SATURATION: 100 % | WEIGHT: 186.94 LBS | BODY MASS INDEX: 28.33 KG/M2 | DIASTOLIC BLOOD PRESSURE: 68 MMHG | HEART RATE: 75 BPM | TEMPERATURE: 98 F | SYSTOLIC BLOOD PRESSURE: 126 MMHG | HEIGHT: 68 IN

## 2025-07-30 DIAGNOSIS — S42.211A CLOSED FRACTURE OF NECK OF RIGHT HUMERUS, INITIAL ENCOUNTER: ICD-10-CM

## 2025-07-30 PROCEDURE — 99215 OFFICE O/P EST HI 40 MIN: CPT | Mod: PBBFAC,25

## 2025-07-30 PROCEDURE — 3074F SYST BP LT 130 MM HG: CPT | Mod: CPTII,,, | Performed by: SPECIALIST

## 2025-07-30 PROCEDURE — 3078F DIAST BP <80 MM HG: CPT | Mod: CPTII,,, | Performed by: SPECIALIST

## 2025-07-30 PROCEDURE — 4010F ACE/ARB THERAPY RXD/TAKEN: CPT | Mod: CPTII,,, | Performed by: SPECIALIST

## 2025-07-30 PROCEDURE — 3008F BODY MASS INDEX DOCD: CPT | Mod: CPTII,,, | Performed by: SPECIALIST

## 2025-07-30 PROCEDURE — 73030 X-RAY EXAM OF SHOULDER: CPT | Mod: TC,RT

## 2025-07-30 PROCEDURE — 1159F MED LIST DOCD IN RCRD: CPT | Mod: CPTII,,, | Performed by: SPECIALIST

## 2025-07-30 PROCEDURE — 99203 OFFICE O/P NEW LOW 30 MIN: CPT | Mod: S$PBB,,, | Performed by: SPECIALIST

## 2025-07-30 RX ORDER — MELOXICAM 15 MG/1
15 TABLET ORAL DAILY
Qty: 30 TABLET | Refills: 0 | Status: SHIPPED | OUTPATIENT
Start: 2025-07-30 | End: 2025-08-29

## 2025-07-30 RX ORDER — SEMAGLUTIDE 0.68 MG/ML
INJECTION, SOLUTION SUBCUTANEOUS
COMMUNITY

## 2025-07-30 RX ORDER — HYDROCODONE BITARTRATE AND ACETAMINOPHEN 5; 325 MG/1; MG/1
1 TABLET ORAL EVERY 8 HOURS PRN
COMMUNITY
Start: 2025-07-28

## 2025-07-30 NOTE — PROGRESS NOTES
[facility] Orthopaedic Surgery Clinic Progress Note     In brief, Alphonso Wyatt is a 64 y.o. RHD male who is retired (previously an orthopedic nurse) w/ PMHx of A-Fib (Xarelto and ASA), CHF, T2DM (uncontrolled) who fell from stairs (does not remember whether he lost consciousness) and sustained a R proximal humerus fracture (DOI: 7/21/25).    HPI:  The patient states that he fell down a few steps, and landed directly onto his right arm.  His pain has been overall controlled while being in the sling.  He has no numbness or tingling in the right upper extremity.  He has no prior history of right proximal humerus fractures.    PMH:   Past Medical History:   Diagnosis Date    Atrial fibrillation     Atrial flutter     CHF (congestive heart failure)     Diabetes mellitus     Hypertension        PSH:   Past Surgical History:   Procedure Laterality Date    RHINOPLASTY      TONSILLECTOMY  1987    TONSILLECTOMY AND ADENOIDECTOMY      UMBILICAL HERNIA REPAIR         SH: Social History[1]    FH:   Family History   Problem Relation Name Age of Onset    Diabetes Mother Salvador Santos     Heart disease Mother Salvador Santos     COPD Maternal Grandfather Chris Wyatt     Heart disease Maternal Grandfather Chris Wyatt        Allergies: Review of patient's allergies indicates:  No Known Allergies     Physical Exam:    Vitals:    07/30/25 1253   BP: (!) 150/73   Pulse: 75   Resp: 20   Temp: 98 °F (36.7 °C)       General: NAD, AAOx3    MSK RUE  Ecchymosis noted throughout the arm, with no gross deformity no skin lesions noted   Significant TTP along the proximal humerus   Shoulder ROM and STR testing deferred due to known fracture   SILT Axillary/M/R/U  Motor intact AIN/PIN/U  RP 2+    Imaging:  X-rays of the right shoulder obtained today demonstrate proximal humerus fracture involving the greater tuberosity and surgical neck.  No subluxations or dislocations noted.     Assessment/Plan: Alphonso Wyatt is a 64 y.o. RHD male who is  retired (previously an orthopedic nurse) w/ PMHx of A-Fib (Xarelto and ASA), CHF, T2DM (uncontrolled) who fell from stairs (does not remember whether he lost consciousness) and sustained a R proximal humerus fracture (DOI: 7/21/25).    - NWB RUE in sling, counseled patient to come out of the sling multiple times daily for elbow range of motion  - Prescription for Meloxicam sent for pain (use with meals)  - Follow-up in 4 weeks for repeat evaluation with right shoulder x-rays     Shailesh Caceres MD, ATC  [employer] Orthopaedic Surgery, PGY-3    7/30/2025 1:22 PM       [1]   Social History  Socioeconomic History    Marital status: Single   Tobacco Use    Smoking status: Every Day     Current packs/day: 2.00     Average packs/day: 2.0 packs/day for 15.0 years (30.0 ttl pk-yrs)     Types: Cigarettes    Smokeless tobacco: Never    Tobacco comments:     since 14 years old   Substance and Sexual Activity    Alcohol use: Never    Drug use: Never    Sexual activity: Never     Social Drivers of Health     Financial Resource Strain: Medium Risk (7/9/2025)    [facility] and Its Subsidiaries and Affiliates    Overall Financial Resource Strain (CARDIA)     Difficulty of Paying Living Expenses: Somewhat hard   Food Insecurity: Food Insecurity Present (7/9/2025)    [facility] and Its Subsidiaries and Affiliates    Hunger Vital Sign     Worried About Running Out of Food in the Last Year: Sometimes true     Ran Out of Food in the Last Year: Sometimes true   Transportation Needs: Unmet Transportation Needs (7/9/2025)    Received from [facility] and Its Subsidiaries and Affiliates    PRAPARE - Transportation     Lack of Transportation (Medical): Yes     Lack of Transportation (Non-Medical): Yes   Physical Activity: Insufficiently Active (7/9/2025)    [facility] and Its Subsidiaries and Affiliates    Exercise Vital Sign     Days of Exercise per Week: 3 days     Minutes of Exercise per Session: 30 min   Stress: Stress Concern Present (7/9/2025)    Received from [facility] and Its Subsidiaries and Affiliates    [facility] - Occupational Stress Questionnaire     Feeling of Stress : Rather much   Housing Stability: High Risk (7/9/2025)    Received from [facility] and Its Subsidiaries and Affiliates    Housing Stability Vital Sign     Unable to Pay for Housing in the Last Year: Yes     Number of Times Moved in the Last Year: 0     Homeless in the Last Year: No

## 2025-07-31 NOTE — PROGRESS NOTES
Faculty Attestation: Alphonso Wyatt  was seen at [facility] in the Orthopaedic Clinic. Discussed with the resident at the time of the visit. History of Present Illness, Physical Exam, and Assessment and Plan reviewed. Treatment plan is reasonable and appropriate. Compliance with treatment recommendations is important. No procedure was performed.     Xavier Alarcon MD  Orthopaedic Surgery

## 2025-08-26 ENCOUNTER — OFFICE VISIT (OUTPATIENT)
Dept: CARDIOLOGY | Facility: CLINIC | Age: 65
End: 2025-08-26
Payer: MEDICAID

## 2025-08-26 VITALS
RESPIRATION RATE: 18 BRPM | SYSTOLIC BLOOD PRESSURE: 150 MMHG | HEART RATE: 73 BPM | HEIGHT: 68 IN | OXYGEN SATURATION: 100 % | TEMPERATURE: 98 F | WEIGHT: 186 LBS | DIASTOLIC BLOOD PRESSURE: 66 MMHG | BODY MASS INDEX: 28.19 KG/M2

## 2025-08-26 DIAGNOSIS — I10 HYPERTENSION, UNSPECIFIED TYPE: Primary | ICD-10-CM

## 2025-08-26 PROCEDURE — 99215 OFFICE O/P EST HI 40 MIN: CPT | Mod: PBBFAC | Performed by: INTERNAL MEDICINE

## 2025-08-26 PROCEDURE — 93005 ELECTROCARDIOGRAM TRACING: CPT

## 2025-08-26 RX ORDER — AMLODIPINE BESYLATE 10 MG/1
10 TABLET ORAL EVERY MORNING
COMMUNITY

## 2025-08-26 RX ORDER — RIVAROXABAN 20 MG/1
20 TABLET, FILM COATED ORAL DAILY
COMMUNITY
Start: 2025-06-11

## 2025-08-26 RX ORDER — HYDROCHLOROTHIAZIDE 12.5 MG/1
12.5 CAPSULE ORAL DAILY
Qty: 30 CAPSULE | Refills: 3 | Status: SHIPPED | OUTPATIENT
Start: 2025-08-26

## 2025-08-27 ENCOUNTER — HOSPITAL ENCOUNTER (OUTPATIENT)
Dept: RADIOLOGY | Facility: HOSPITAL | Age: 65
Discharge: HOME | End: 2025-08-27
Attending: STUDENT IN AN ORGANIZED HEALTH CARE EDUCATION/TRAINING PROGRAM
Payer: MEDICAID

## 2025-08-27 ENCOUNTER — OFFICE VISIT (OUTPATIENT)
Dept: ORTHOPEDICS | Facility: CLINIC | Age: 65
End: 2025-08-27
Payer: MEDICAID

## 2025-08-27 VITALS
BODY MASS INDEX: 28.34 KG/M2 | RESPIRATION RATE: 20 BRPM | SYSTOLIC BLOOD PRESSURE: 148 MMHG | TEMPERATURE: 98 F | OXYGEN SATURATION: 100 % | DIASTOLIC BLOOD PRESSURE: 71 MMHG | HEART RATE: 76 BPM | HEIGHT: 67 IN | WEIGHT: 180.56 LBS

## 2025-08-27 DIAGNOSIS — S42.291D OTHER CLOSED DISPLACED FRACTURE OF PROXIMAL END OF RIGHT HUMERUS WITH ROUTINE HEALING, SUBSEQUENT ENCOUNTER: Primary | ICD-10-CM

## 2025-08-27 DIAGNOSIS — S42.211A CLOSED FRACTURE OF NECK OF RIGHT HUMERUS, INITIAL ENCOUNTER: ICD-10-CM

## 2025-08-27 LAB
OHS QRS DURATION: 86 MS
OHS QTC CALCULATION: 407 MS

## 2025-08-27 PROCEDURE — 73030 X-RAY EXAM OF SHOULDER: CPT | Mod: TC,RT

## 2025-08-27 PROCEDURE — 99215 OFFICE O/P EST HI 40 MIN: CPT | Mod: PBBFAC,25
